# Patient Record
Sex: FEMALE | Race: WHITE
[De-identification: names, ages, dates, MRNs, and addresses within clinical notes are randomized per-mention and may not be internally consistent; named-entity substitution may affect disease eponyms.]

---

## 2017-09-23 ENCOUNTER — HOSPITAL ENCOUNTER (EMERGENCY)
Dept: HOSPITAL 12 - ER | Age: 67
Discharge: HOME | End: 2017-09-23
Payer: MEDICARE

## 2017-09-23 VITALS — DIASTOLIC BLOOD PRESSURE: 77 MMHG | SYSTOLIC BLOOD PRESSURE: 133 MMHG

## 2017-09-23 VITALS — WEIGHT: 175 LBS | BODY MASS INDEX: 32.2 KG/M2 | HEIGHT: 62 IN

## 2017-09-23 DIAGNOSIS — I10: ICD-10-CM

## 2017-09-23 DIAGNOSIS — Z90.710: ICD-10-CM

## 2017-09-23 DIAGNOSIS — Z90.49: ICD-10-CM

## 2017-09-23 DIAGNOSIS — K85.90: Primary | ICD-10-CM

## 2017-09-23 LAB
ALP SERPL-CCNC: 90 U/L (ref 50–136)
ALT SERPL W/O P-5'-P-CCNC: 23 U/L (ref 14–59)
APPEARANCE UR: CLEAR
AST SERPL-CCNC: 20 U/L (ref 15–37)
BASOPHILS # BLD AUTO: 0 K/UL (ref 0–8)
BASOPHILS NFR BLD AUTO: 0.3 % (ref 0–2)
BILIRUB DIRECT SERPL-MCNC: 0.1 MG/DL (ref 0–0.2)
BILIRUB SERPL-MCNC: 0.5 MG/DL (ref 0.2–1)
BILIRUB UR QL STRIP: NEGATIVE
BUN SERPL-MCNC: 12 MG/DL (ref 7–18)
CHLORIDE SERPL-SCNC: 108 MMOL/L (ref 98–107)
CO2 SERPL-SCNC: 27 MMOL/L (ref 21–32)
COLOR UR: YELLOW
CREAT SERPL-MCNC: 0.5 MG/DL (ref 0.6–1.3)
DEPRECATED SQUAMOUS URNS QL MICRO: (no result) /HPF
EOSINOPHIL # BLD AUTO: 0.1 K/UL (ref 0–0.7)
EOSINOPHIL NFR BLD AUTO: 0.5 % (ref 0–7)
GLUCOSE SERPL-MCNC: 95 MG/DL (ref 74–106)
GLUCOSE UR STRIP-MCNC: NEGATIVE MG/DL
HCT VFR BLD AUTO: 41.5 % (ref 31.2–41.9)
HGB BLD-MCNC: 14.3 G/DL (ref 10.9–14.3)
KETONES UR STRIP-MCNC: NEGATIVE MG/DL
LEUKOCYTE ESTERASE UR QL STRIP: (no result)
LIPASE SERPL-CCNC: 163 U/L (ref 73–393)
LYMPHOCYTES # BLD AUTO: 1.3 K/UL (ref 20–40)
LYMPHOCYTES NFR BLD AUTO: 10.9 % (ref 20.5–51.5)
MCH RBC QN AUTO: 30.6 UUG (ref 24.7–32.8)
MCHC RBC AUTO-ENTMCNC: 34 G/DL (ref 32.3–35.6)
MCV RBC AUTO: 88.9 FL (ref 75.5–95.3)
MONOCYTES # BLD AUTO: 0.7 K/UL (ref 2–10)
MONOCYTES NFR BLD AUTO: 5.7 % (ref 0–11)
MUCOUS THREADS URNS QL MICRO: (no result) /LPF
NEUTROPHILS # BLD AUTO: 10.1 K/UL (ref 1.8–8.9)
NEUTROPHILS NFR BLD AUTO: 82.6 % (ref 38.5–71.5)
NITRITE UR QL STRIP: NEGATIVE
PH UR STRIP: 5.5 [PH] (ref 5–8)
PLATELET # BLD AUTO: 191 K/UL (ref 179–408)
POTASSIUM SERPL-SCNC: 3.5 MMOL/L (ref 3.5–5.1)
PROT UR QL STRIP: NEGATIVE
RBC # BLD AUTO: 4.67 MIL/UL (ref 3.63–4.92)
RBC #/AREA URNS HPF: (no result) /HPF (ref 0–3)
SP GR UR STRIP: 1.01 (ref 1–1.03)
UROBILINOGEN UR STRIP-MCNC: 0.2 E.U./DL
WBC # BLD AUTO: 12.2 K/UL (ref 3.8–11.8)
WBC #/AREA URNS HPF: (no result) /HPF
WBC #/AREA URNS HPF: (no result) /HPF (ref 0–3)
WS STN SPEC: 8 G/DL (ref 6.4–8.2)

## 2017-09-23 PROCEDURE — 96375 TX/PRO/DX INJ NEW DRUG ADDON: CPT

## 2017-09-23 PROCEDURE — 85730 THROMBOPLASTIN TIME PARTIAL: CPT

## 2017-09-23 PROCEDURE — 80076 HEPATIC FUNCTION PANEL: CPT

## 2017-09-23 PROCEDURE — 83690 ASSAY OF LIPASE: CPT

## 2017-09-23 PROCEDURE — 80048 BASIC METABOLIC PNL TOTAL CA: CPT

## 2017-09-23 PROCEDURE — 99285 EMERGENCY DEPT VISIT HI MDM: CPT

## 2017-09-23 PROCEDURE — 71010: CPT

## 2017-09-23 PROCEDURE — A4663 DIALYSIS BLOOD PRESSURE CUFF: HCPCS

## 2017-09-23 PROCEDURE — 85025 COMPLETE CBC W/AUTO DIFF WBC: CPT

## 2017-09-23 PROCEDURE — C9113 INJ PANTOPRAZOLE SODIUM, VIA: HCPCS

## 2017-09-23 PROCEDURE — 36415 COLL VENOUS BLD VENIPUNCTURE: CPT

## 2017-09-23 PROCEDURE — 96374 THER/PROPH/DIAG INJ IV PUSH: CPT

## 2017-09-23 PROCEDURE — 74176 CT ABD & PELVIS W/O CONTRAST: CPT

## 2017-09-23 PROCEDURE — 81001 URINALYSIS AUTO W/SCOPE: CPT

## 2017-09-23 PROCEDURE — 93005 ELECTROCARDIOGRAM TRACING: CPT

## 2017-09-23 PROCEDURE — 96361 HYDRATE IV INFUSION ADD-ON: CPT

## 2017-09-23 PROCEDURE — 87086 URINE CULTURE/COLONY COUNT: CPT

## 2017-09-23 NOTE — NUR
Patient is resting comfortably in bed with eyes closed, some relief expressed, 
pending results & disposition

## 2017-09-23 NOTE — NUR
Patient discharged to home in stable conditon.  Written and verbal after care 
instructions given. 

Patient and son verbalizes understanding of instructions.

## 2017-09-29 ENCOUNTER — HOSPITAL ENCOUNTER (INPATIENT)
Dept: HOSPITAL 12 - ER | Age: 67
LOS: 4 days | Discharge: HOME | DRG: 395 | End: 2017-10-03
Payer: MEDICARE

## 2017-09-29 VITALS — SYSTOLIC BLOOD PRESSURE: 141 MMHG | DIASTOLIC BLOOD PRESSURE: 63 MMHG

## 2017-09-29 VITALS — WEIGHT: 170 LBS | HEIGHT: 62 IN | BODY MASS INDEX: 31.28 KG/M2

## 2017-09-29 VITALS — DIASTOLIC BLOOD PRESSURE: 53 MMHG | SYSTOLIC BLOOD PRESSURE: 122 MMHG

## 2017-09-29 DIAGNOSIS — K64.8: ICD-10-CM

## 2017-09-29 DIAGNOSIS — K31.7: ICD-10-CM

## 2017-09-29 DIAGNOSIS — F32.9: ICD-10-CM

## 2017-09-29 DIAGNOSIS — G89.29: ICD-10-CM

## 2017-09-29 DIAGNOSIS — I10: ICD-10-CM

## 2017-09-29 DIAGNOSIS — K66.0: Primary | ICD-10-CM

## 2017-09-29 DIAGNOSIS — K76.0: ICD-10-CM

## 2017-09-29 DIAGNOSIS — K29.70: ICD-10-CM

## 2017-09-29 DIAGNOSIS — E87.6: ICD-10-CM

## 2017-09-29 DIAGNOSIS — K57.30: ICD-10-CM

## 2017-09-29 DIAGNOSIS — Z90.710: ICD-10-CM

## 2017-09-29 DIAGNOSIS — Z90.49: ICD-10-CM

## 2017-09-29 DIAGNOSIS — E66.9: ICD-10-CM

## 2017-09-29 DIAGNOSIS — K44.9: ICD-10-CM

## 2017-09-29 DIAGNOSIS — R74.0: ICD-10-CM

## 2017-09-29 LAB
ALP SERPL-CCNC: 106 U/L (ref 50–136)
ALT SERPL W/O P-5'-P-CCNC: 52 U/L (ref 14–59)
APPEARANCE UR: CLEAR
AST SERPL-CCNC: 25 U/L (ref 15–37)
BASOPHILS # BLD AUTO: 0.1 K/UL (ref 0–8)
BASOPHILS NFR BLD AUTO: 0.7 % (ref 0–2)
BILIRUB DIRECT SERPL-MCNC: 0.1 MG/DL (ref 0–0.2)
BILIRUB SERPL-MCNC: 0.5 MG/DL (ref 0.2–1)
BILIRUB UR QL STRIP: NEGATIVE
BUN SERPL-MCNC: 9 MG/DL (ref 7–18)
CHLORIDE SERPL-SCNC: 106 MMOL/L (ref 98–107)
CO2 SERPL-SCNC: 30 MMOL/L (ref 21–32)
COLOR UR: YELLOW
CREAT SERPL-MCNC: 0.6 MG/DL (ref 0.6–1.3)
DEPRECATED SQUAMOUS URNS QL MICRO: (no result) /HPF
EOSINOPHIL # BLD AUTO: 0.1 K/UL (ref 0–0.7)
EOSINOPHIL NFR BLD AUTO: 0.9 % (ref 0–7)
GLUCOSE SERPL-MCNC: 94 MG/DL (ref 74–106)
GLUCOSE UR STRIP-MCNC: NEGATIVE MG/DL
HCT VFR BLD AUTO: 43.8 % (ref 31.2–41.9)
HGB BLD-MCNC: 15 G/DL (ref 10.9–14.3)
HGB UR QL STRIP: NEGATIVE
KETONES UR STRIP-MCNC: NEGATIVE MG/DL
LEUKOCYTE ESTERASE UR QL STRIP: (no result)
LIPASE SERPL-CCNC: 225 U/L (ref 73–393)
LYMPHOCYTES # BLD AUTO: 1.7 K/UL (ref 20–40)
LYMPHOCYTES NFR BLD AUTO: 21 % (ref 20.5–51.5)
MCH RBC QN AUTO: 30.8 UUG (ref 24.7–32.8)
MCHC RBC AUTO-ENTMCNC: 34 G/DL (ref 32.3–35.6)
MCV RBC AUTO: 89.7 FL (ref 75.5–95.3)
MONOCYTES # BLD AUTO: 0.4 K/UL (ref 2–10)
MONOCYTES NFR BLD AUTO: 5.5 % (ref 0–11)
NEUTROPHILS # BLD AUTO: 5.7 K/UL (ref 1.8–8.9)
NEUTROPHILS NFR BLD AUTO: 71.9 % (ref 38.5–71.5)
NITRITE UR QL STRIP: NEGATIVE
PH UR STRIP: 7 [PH] (ref 5–8)
PLATELET # BLD AUTO: 208 K/UL (ref 179–408)
POTASSIUM SERPL-SCNC: 4 MMOL/L (ref 3.5–5.1)
PROT UR QL STRIP: NEGATIVE
RBC # BLD AUTO: 4.88 MIL/UL (ref 3.63–4.92)
RBC #/AREA URNS HPF: (no result) /HPF (ref 0–3)
SP GR UR STRIP: 1.01 (ref 1–1.03)
UROBILINOGEN UR STRIP-MCNC: 0.2 E.U./DL
WBC # BLD AUTO: 7.9 K/UL (ref 3.8–11.8)
WBC #/AREA URNS HPF: (no result) /HPF
WBC #/AREA URNS HPF: (no result) /HPF (ref 0–3)
WS STN SPEC: 8.2 G/DL (ref 6.4–8.2)

## 2017-09-29 PROCEDURE — A4217 STERILE WATER/SALINE, 500 ML: HCPCS

## 2017-09-29 PROCEDURE — A4663 DIALYSIS BLOOD PRESSURE CUFF: HCPCS

## 2017-09-29 RX ADMIN — SODIUM CHLORIDE PRN MLS/HR: 0.9 INJECTION, SOLUTION INTRAVENOUS at 15:14

## 2017-09-29 NOTE — NUR
Dr Lovelace at bedside for re-eval. Pt states pain and nausea improved. Call 
placed to [facility] for admission.

## 2017-09-29 NOTE — NUR
67 year old female admitted to room 218 for abdominal pain in stable condition.pt is 
axox3,daughter is at bed side,orient the pt to room and surroundings,call light with in 
reach

## 2017-09-30 VITALS — DIASTOLIC BLOOD PRESSURE: 53 MMHG | SYSTOLIC BLOOD PRESSURE: 129 MMHG

## 2017-09-30 VITALS — DIASTOLIC BLOOD PRESSURE: 60 MMHG | SYSTOLIC BLOOD PRESSURE: 118 MMHG

## 2017-09-30 VITALS — DIASTOLIC BLOOD PRESSURE: 47 MMHG | SYSTOLIC BLOOD PRESSURE: 115 MMHG

## 2017-09-30 VITALS — DIASTOLIC BLOOD PRESSURE: 66 MMHG | SYSTOLIC BLOOD PRESSURE: 137 MMHG

## 2017-09-30 LAB
ALP SERPL-CCNC: 165 U/L (ref 50–136)
ALT SERPL W/O P-5'-P-CCNC: 99 U/L (ref 14–59)
AST SERPL-CCNC: 114 U/L (ref 15–37)
BASOPHILS # BLD AUTO: 0 K/UL (ref 0–8)
BASOPHILS NFR BLD AUTO: 0 % (ref 0–2)
BILIRUB SERPL-MCNC: 0.6 MG/DL (ref 0.2–1)
BUN SERPL-MCNC: 8 MG/DL (ref 7–18)
CHLORIDE SERPL-SCNC: 109 MMOL/L (ref 98–107)
CHOLEST SERPL-MCNC: 181 MG/DL (ref ?–200)
CO2 SERPL-SCNC: 28 MMOL/L (ref 21–32)
CREAT SERPL-MCNC: 0.6 MG/DL (ref 0.6–1.3)
EOSINOPHIL # BLD AUTO: 0.1 K/UL (ref 0–0.7)
EOSINOPHIL NFR BLD AUTO: 1.4 % (ref 0–7)
GLUCOSE SERPL-MCNC: 96 MG/DL (ref 74–106)
HCT VFR BLD AUTO: 39.3 % (ref 37–47)
HDLC SERPL-MCNC: 54 MG/DL (ref 40–60)
HGB BLD-MCNC: 13.1 G/DL (ref 12–16)
LYMPHOCYTES # BLD AUTO: 1.1 K/UL (ref 0.8–4.8)
LYMPHOCYTES NFR BLD AUTO: 17.6 % (ref 20.5–51.5)
MAGNESIUM SERPL-MCNC: 1.9 MG/DL (ref 1.8–2.4)
MCH RBC QN AUTO: 30.1 UUG (ref 27–31)
MCHC RBC AUTO-ENTMCNC: 33 G/DL (ref 32–37)
MCV RBC AUTO: 90.4 FL (ref 81–99)
MONOCYTES # BLD AUTO: 0.5 K/UL (ref 0.1–1.3)
MONOCYTES NFR BLD AUTO: 8 % (ref 0–11)
NEUTROPHILS # BLD AUTO: 4.8 K/UL (ref 1.8–8.9)
NEUTROPHILS NFR BLD AUTO: 73 % (ref 38.5–71.5)
PHOSPHATE SERPL-MCNC: 2.7 MG/DL (ref 2.5–4.9)
PLATELET # BLD AUTO: 190 K/UL (ref 150–450)
POTASSIUM SERPL-SCNC: 3.8 MMOL/L (ref 3.5–5.1)
RBC # BLD AUTO: 4.35 MIL/UL (ref 4.2–5.4)
TRIGL SERPL-MCNC: 74 MG/DL (ref 30–150)
TSH SERPL DL<=0.005 MIU/L-ACNC: 1.74 MIU/ML (ref 0.36–3.74)
WBC # BLD AUTO: 6.5 K/UL (ref 4–11.2)
WS STN SPEC: 7 G/DL (ref 6.4–8.2)

## 2017-09-30 RX ADMIN — SODIUM CHLORIDE PRN MLS/HR: 0.9 INJECTION, SOLUTION INTRAVENOUS at 05:24

## 2017-09-30 RX ADMIN — Medication SCH MG: at 21:23

## 2017-09-30 RX ADMIN — Medication SCH MG: at 13:14

## 2017-09-30 RX ADMIN — SODIUM CHLORIDE PRN MLS/HR: 0.9 INJECTION, SOLUTION INTRAVENOUS at 18:14

## 2017-09-30 RX ADMIN — PANTOPRAZOLE SODIUM SCH MG: 40 TABLET, DELAYED RELEASE ORAL at 07:37

## 2017-09-30 NOTE — NUR
RECEIVED PATIENT AWAKE ALERT AND ORIENTED DENIES PAIN OR DISCOMFORTS AT THIS TIME.REMAIN ON 
IVF AS ORDERED TO HER LEFT ANTECUBITAL WITH NO S/S OF INFILTERATION AT THIS TIME MADE 
COMFORTABLE AND WILL CONTINUE TO OBSERVE PATIENT.

## 2017-09-30 NOTE — NUR
Received pt on bed alert, awake and oriented x4, Papua [place] Guinean speaking. Family at bedside during 
this time. No acute distress noted. No complaints of pain or discomfort. Breathing even and 
unlabored with normal respirations. IV on LAC intact and patent. Vital signs stable. Call 
light within reach. All needs attended. Will continue to monitor.

## 2017-10-01 VITALS — SYSTOLIC BLOOD PRESSURE: 107 MMHG | DIASTOLIC BLOOD PRESSURE: 59 MMHG

## 2017-10-01 VITALS — DIASTOLIC BLOOD PRESSURE: 76 MMHG | SYSTOLIC BLOOD PRESSURE: 121 MMHG

## 2017-10-01 VITALS — SYSTOLIC BLOOD PRESSURE: 136 MMHG | DIASTOLIC BLOOD PRESSURE: 60 MMHG

## 2017-10-01 VITALS — SYSTOLIC BLOOD PRESSURE: 130 MMHG | DIASTOLIC BLOOD PRESSURE: 55 MMHG

## 2017-10-01 LAB
ALP SERPL-CCNC: 132 U/L (ref 50–136)
ALT SERPL W/O P-5'-P-CCNC: 69 U/L (ref 14–59)
AST SERPL-CCNC: 39 U/L (ref 15–37)
BILIRUB DIRECT SERPL-MCNC: 0.1 MG/DL (ref 0–0.2)
BILIRUB SERPL-MCNC: 0.4 MG/DL (ref 0.2–1)
WS STN SPEC: 7.3 G/DL (ref 6.4–8.2)

## 2017-10-01 RX ADMIN — Medication SCH MG: at 06:26

## 2017-10-01 RX ADMIN — FAMOTIDINE SCH MG: 20 TABLET, FILM COATED ORAL at 20:16

## 2017-10-01 RX ADMIN — DICYCLOMINE HYDROCHLORIDE SCH MG: 20 INJECTION, SOLUTION INTRAMUSCULAR at 12:06

## 2017-10-01 RX ADMIN — SODIUM CHLORIDE PRN MLS/HR: 0.9 INJECTION, SOLUTION INTRAVENOUS at 22:04

## 2017-10-01 RX ADMIN — SODIUM CHLORIDE PRN MLS/HR: 0.9 INJECTION, SOLUTION INTRAVENOUS at 07:55

## 2017-10-01 RX ADMIN — FAMOTIDINE SCH MG: 20 TABLET, FILM COATED ORAL at 12:03

## 2017-10-01 RX ADMIN — Medication SCH MG: at 12:03

## 2017-10-01 RX ADMIN — Medication SCH MG: at 22:02

## 2017-10-01 RX ADMIN — Medication SCH MG: at 13:01

## 2017-10-01 RX ADMIN — PANTOPRAZOLE SODIUM SCH MG: 40 TABLET, DELAYED RELEASE ORAL at 06:26

## 2017-10-01 RX ADMIN — DICYCLOMINE HYDROCHLORIDE SCH MG: 20 INJECTION, SOLUTION INTRAMUSCULAR at 17:45

## 2017-10-01 NOTE — NUR
RECEIVED PATIENT FROM PREVIOUS SHIFT. PATIENT IN STABLE CONDITION, NO SIGNS OF DISTRESS. 
VITAL SIGNS WNL. PATIENT COMPLAINING OF ABD PAIN BUT DOES NOT WANT PAIN MEDICATION. NO FALL 
RISK. IV ACCESS NOTED, PATENT, INTACT. O2 NOT NEEDED. BED IN LOCKED POSITION. CALL-LIGHT 
WITHIN REACH. SAFETY AND COMFORT PROVIDED.

## 2017-10-01 NOTE — NUR
PATIENT SEEN AND EXAMINED BY DR LACY DALLAS SPOKE WITH PATIENTS DAUGHTER AT LENGTH WITH NEW 
ORDERS.DR DALLAS STATED THAT HE ORDERED PHYSICIAN CONSULT WITH DR RANDOLPH FOR A POSSIBLE 
COLONOSCOPY TOMORROW PATIENT AND FAMILY EXPRESSED UNDERSTANDING.

## 2017-10-01 NOTE — NUR
EDUCATED TO PATIENT AND  THE IMPORTANCE OF DRINKING GO-LIGHTLY. VERBALIZED TO PATIENT 
AND  ABOUT THE IMPORTANCE OF CLEAR LIQUID DIET, AND NPO STATUS AFTER 0700 ON 10/2/17 
FOR EGD. PATIENT AND  VERBALIZED THE IMPORTANCE.

## 2017-10-01 NOTE — NUR
PATIENT STATED THAT HER PAIN CAME BACK MORE TOLERABLE NOW SINCE SHE TOOK THE MEDICATIONS 
EARLIER THIS AM REMAIN ON IVF AS ORDERED EATING HER B/FAST BUT STATED THAT SHE CAN ONLY EAT 
A LITTLE DUE TO THE PAIN.

## 2017-10-01 NOTE — NUR
Patient complained of abdominal pain, bentyl given as ordered. Verbalization of relief was 
noted. However, daughter requested to call the docror. Tried to call on call MD, still 
awaiting for response. Call light within reach. All needs attended.

## 2017-10-01 NOTE — NUR
DR RANDOLPH HERE TO SEE PATIENT WITH NEW ORDERS PATIENT IS FOR EGD/COLONOSCOPY TOMORROW 
FAMILY AND PATIENT AWARE AND WILL OBTAIN CONSCENT.

## 2017-10-02 VITALS — SYSTOLIC BLOOD PRESSURE: 116 MMHG | DIASTOLIC BLOOD PRESSURE: 47 MMHG

## 2017-10-02 VITALS — DIASTOLIC BLOOD PRESSURE: 48 MMHG | SYSTOLIC BLOOD PRESSURE: 118 MMHG

## 2017-10-02 VITALS — DIASTOLIC BLOOD PRESSURE: 64 MMHG | SYSTOLIC BLOOD PRESSURE: 133 MMHG

## 2017-10-02 VITALS — SYSTOLIC BLOOD PRESSURE: 128 MMHG | DIASTOLIC BLOOD PRESSURE: 58 MMHG

## 2017-10-02 VITALS — SYSTOLIC BLOOD PRESSURE: 115 MMHG | DIASTOLIC BLOOD PRESSURE: 51 MMHG

## 2017-10-02 VITALS — DIASTOLIC BLOOD PRESSURE: 55 MMHG | SYSTOLIC BLOOD PRESSURE: 138 MMHG

## 2017-10-02 VITALS — DIASTOLIC BLOOD PRESSURE: 76 MMHG | SYSTOLIC BLOOD PRESSURE: 125 MMHG

## 2017-10-02 LAB
ALP SERPL-CCNC: 118 U/L (ref 50–136)
ALT SERPL W/O P-5'-P-CCNC: 52 U/L (ref 14–59)
AST SERPL-CCNC: 29 U/L (ref 15–37)
BASOPHILS # BLD AUTO: 0 K/UL (ref 0–8)
BASOPHILS NFR BLD AUTO: 0.3 % (ref 0–2)
BILIRUB SERPL-MCNC: 0.4 MG/DL (ref 0.2–1)
BUN SERPL-MCNC: 8 MG/DL (ref 7–18)
CHLORIDE SERPL-SCNC: 108 MMOL/L (ref 98–107)
CO2 SERPL-SCNC: 27 MMOL/L (ref 21–32)
CREAT SERPL-MCNC: 0.5 MG/DL (ref 0.6–1.3)
EOSINOPHIL # BLD AUTO: 0 K/UL (ref 0–0.7)
EOSINOPHIL NFR BLD AUTO: 0.5 % (ref 0–7)
GLUCOSE SERPL-MCNC: 98 MG/DL (ref 74–106)
HCT VFR BLD AUTO: 37.8 % (ref 37–47)
HGB BLD-MCNC: 12.7 G/DL (ref 12–16)
LIPASE SERPL-CCNC: 256 U/L (ref 73–393)
LYMPHOCYTES # BLD AUTO: 1.3 K/UL (ref 0.8–4.8)
LYMPHOCYTES NFR BLD AUTO: 22.6 % (ref 20.5–51.5)
MAGNESIUM SERPL-MCNC: 1.8 MG/DL (ref 1.8–2.4)
MCH RBC QN AUTO: 30 UUG (ref 27–31)
MCHC RBC AUTO-ENTMCNC: 34 G/DL (ref 32–37)
MCV RBC AUTO: 89.4 FL (ref 81–99)
MONOCYTES # BLD AUTO: 0.4 K/UL (ref 0.1–1.3)
MONOCYTES NFR BLD AUTO: 7.3 % (ref 0–11)
NEUTROPHILS # BLD AUTO: 3.9 K/UL (ref 1.8–8.9)
NEUTROPHILS NFR BLD AUTO: 69.3 % (ref 38.5–71.5)
PHOSPHATE SERPL-MCNC: 2.8 MG/DL (ref 2.5–4.9)
PLATELET # BLD AUTO: 206 K/UL (ref 150–450)
POTASSIUM SERPL-SCNC: 3.4 MMOL/L (ref 3.5–5.1)
RBC # BLD AUTO: 4.23 MIL/UL (ref 4.2–5.4)
WBC # BLD AUTO: 5.6 K/UL (ref 4–11.2)
WS STN SPEC: 6.9 G/DL (ref 6.4–8.2)

## 2017-10-02 PROCEDURE — 0DB68ZX EXCISION OF STOMACH, VIA NATURAL OR ARTIFICIAL OPENING ENDOSCOPIC, DIAGNOSTIC: ICD-10-PCS | Performed by: SURGERY

## 2017-10-02 PROCEDURE — 0DJD8ZZ INSPECTION OF LOWER INTESTINAL TRACT, VIA NATURAL OR ARTIFICIAL OPENING ENDOSCOPIC: ICD-10-PCS | Performed by: SURGERY

## 2017-10-02 RX ADMIN — FAMOTIDINE SCH MG: 20 TABLET, FILM COATED ORAL at 21:01

## 2017-10-02 RX ADMIN — PANTOPRAZOLE SODIUM SCH MG: 40 TABLET, DELAYED RELEASE ORAL at 05:58

## 2017-10-02 RX ADMIN — DICYCLOMINE HYDROCHLORIDE SCH MG: 20 INJECTION, SOLUTION INTRAMUSCULAR at 12:00

## 2017-10-02 RX ADMIN — PANCRELIPASE LIPASE, PANCRELIPASE AMYLASE, AND PANCRELIPASE PROTEASE SCH EACH: 4200; 24600; 14200 CAPSULE, DELAYED RELEASE ORAL at 17:10

## 2017-10-02 RX ADMIN — Medication SCH MG: at 05:58

## 2017-10-02 RX ADMIN — Medication SCH MG: at 08:21

## 2017-10-02 RX ADMIN — Medication SCH CAP: at 17:10

## 2017-10-02 RX ADMIN — SODIUM CHLORIDE PRN MLS/HR: 0.9 INJECTION, SOLUTION INTRAVENOUS at 18:18

## 2017-10-02 RX ADMIN — DICYCLOMINE HYDROCHLORIDE SCH MG: 20 INJECTION, SOLUTION INTRAMUSCULAR at 00:21

## 2017-10-02 RX ADMIN — Medication SCH MG: at 13:42

## 2017-10-02 RX ADMIN — Medication SCH MG: at 21:01

## 2017-10-02 RX ADMIN — DICYCLOMINE HYDROCHLORIDE SCH MG: 20 INJECTION, SOLUTION INTRAMUSCULAR at 05:57

## 2017-10-02 RX ADMIN — SODIUM CHLORIDE PRN MLS/HR: 0.9 INJECTION, SOLUTION INTRAVENOUS at 12:37

## 2017-10-02 RX ADMIN — Medication SCH CAP: at 21:01

## 2017-10-02 RX ADMIN — FAMOTIDINE SCH MG: 20 TABLET, FILM COATED ORAL at 08:21

## 2017-10-02 NOTE — NUR
PATIENT RETURNED FROM GI LAB BY BED AWAKE ALERT AND ORIENTED DENIES PAIN OR DISCOMFORTS 
STATED THAT SHE FEELS GASSY AND BURPING.ABLE TO TOLERATE JUICE ICE AND JELLO MADE 
COMFORTABLE AND WILL CONTINUE TO OBSERVE.

## 2017-10-02 NOTE — NUR
TOLERATED SOFT DIET FAIR AMOUNT WITH NO NAUSEA OR VOMITING AT THIS TIME.REMAIN ON IVF AS 
ORDERED AND WILL CONTINUE TO OBSERVE.

## 2017-10-02 NOTE — NUR
RECEIVED SHIFT REPORT FROM PREVIOUS SHIFT'S NURSE. PATIENT IN STABLE CONDITION. NO SIGNS OF 
DISTRESS. SAFETY AND COMFORT PROVIDED TO PATIENT.

## 2017-10-02 NOTE — NUR
PATIENT IS FOR EGD/COLONOSCOPY TODAY AND SHE IS CURRENTLY NPO AND HER DUE MEDICATIONS WAS 
GIVEN WITH A SIP OF WATER PATIENT EDUCATED WITH HER  PRESENT AND SHE EXPRESSED 
UNDERSTANDING.

## 2017-10-02 NOTE — NUR
POTASSIUM LEVEL IS 3.4 WITH NEW ORDERS PATIENT IS NPO FOR EGD/COLONOSCOPY WILL ADMINISTER 
AFTER THE PROCEDURE.

## 2017-10-02 NOTE — NUR
PATIENT VERBALIZED THAT SHE FEELS RESTLESS AND FEELS HERSELF SHIVERING. PATIENT'S VITAL 
SIGNS TAKEN AND WNL. PATIENT IN STABLE CONDITION. NO SIGNS OF DISTRESS. WILL CONTINUE TO 
MONITOR.

## 2017-10-03 VITALS — SYSTOLIC BLOOD PRESSURE: 101 MMHG | DIASTOLIC BLOOD PRESSURE: 56 MMHG

## 2017-10-03 VITALS — DIASTOLIC BLOOD PRESSURE: 48 MMHG | SYSTOLIC BLOOD PRESSURE: 125 MMHG

## 2017-10-03 VITALS — DIASTOLIC BLOOD PRESSURE: 49 MMHG | SYSTOLIC BLOOD PRESSURE: 119 MMHG

## 2017-10-03 LAB
BASOPHILS # BLD AUTO: 0 K/UL (ref 0–8)
BASOPHILS NFR BLD AUTO: 0.6 % (ref 0–2)
BUN SERPL-MCNC: 5 MG/DL (ref 7–18)
CHLORIDE SERPL-SCNC: 108 MMOL/L (ref 98–107)
CO2 SERPL-SCNC: 30 MMOL/L (ref 21–32)
CREAT SERPL-MCNC: 0.5 MG/DL (ref 0.6–1.3)
EOSINOPHIL # BLD AUTO: 0.1 K/UL (ref 0–0.7)
EOSINOPHIL NFR BLD AUTO: 2.1 % (ref 0–7)
GLUCOSE SERPL-MCNC: 88 MG/DL (ref 74–106)
HCT VFR BLD AUTO: 38.3 % (ref 37–47)
HGB BLD-MCNC: 12.8 G/DL (ref 12–16)
LYMPHOCYTES # BLD AUTO: 1.3 K/UL (ref 0.8–4.8)
LYMPHOCYTES NFR BLD AUTO: 28.5 % (ref 20.5–51.5)
MAGNESIUM SERPL-MCNC: 1.8 MG/DL (ref 1.8–2.4)
MCH RBC QN AUTO: 30.2 UUG (ref 27–31)
MCHC RBC AUTO-ENTMCNC: 33 G/DL (ref 32–37)
MCV RBC AUTO: 90.7 FL (ref 81–99)
MONOCYTES # BLD AUTO: 0.4 K/UL (ref 0.1–1.3)
MONOCYTES NFR BLD AUTO: 8.3 % (ref 0–11)
NEUTROPHILS # BLD AUTO: 2.8 K/UL (ref 1.8–8.9)
NEUTROPHILS NFR BLD AUTO: 60.5 % (ref 38.5–71.5)
PHOSPHATE SERPL-MCNC: 2.8 MG/DL (ref 2.5–4.9)
PLATELET # BLD AUTO: 195 K/UL (ref 150–450)
POTASSIUM SERPL-SCNC: 3.7 MMOL/L (ref 3.5–5.1)
RBC # BLD AUTO: 4.22 MIL/UL (ref 4.2–5.4)
WBC # BLD AUTO: 4.6 K/UL (ref 4–11.2)

## 2017-10-03 RX ADMIN — Medication SCH CAP: at 12:26

## 2017-10-03 RX ADMIN — PANCRELIPASE LIPASE, PANCRELIPASE AMYLASE, AND PANCRELIPASE PROTEASE SCH EACH: 4200; 24600; 14200 CAPSULE, DELAYED RELEASE ORAL at 08:10

## 2017-10-03 RX ADMIN — Medication SCH MG: at 13:39

## 2017-10-03 RX ADMIN — Medication SCH MG: at 08:11

## 2017-10-03 RX ADMIN — Medication SCH MG: at 05:26

## 2017-10-03 RX ADMIN — PANCRELIPASE LIPASE, PANCRELIPASE AMYLASE, AND PANCRELIPASE PROTEASE SCH EACH: 4200; 24600; 14200 CAPSULE, DELAYED RELEASE ORAL at 17:31

## 2017-10-03 RX ADMIN — Medication SCH CAP: at 07:53

## 2017-10-03 RX ADMIN — PANTOPRAZOLE SODIUM SCH MG: 40 TABLET, DELAYED RELEASE ORAL at 05:26

## 2017-10-03 RX ADMIN — Medication SCH CAP: at 16:41

## 2017-10-03 RX ADMIN — FAMOTIDINE SCH MG: 20 TABLET, FILM COATED ORAL at 08:11

## 2017-10-03 RX ADMIN — PANCRELIPASE LIPASE, PANCRELIPASE AMYLASE, AND PANCRELIPASE PROTEASE SCH EACH: 4200; 24600; 14200 CAPSULE, DELAYED RELEASE ORAL at 12:26

## 2017-10-03 RX ADMIN — SODIUM CHLORIDE PRN MLS/HR: 0.9 INJECTION, SOLUTION INTRAVENOUS at 07:54

## 2017-10-03 NOTE — NUR
PATIENT IS ALERT AND ORIENTED WITH SOME ENGLISH MOSTLY Albanian DENIES PAIN OR DISCOMFORTS AT 
THIS TIME.CONTINUE TO TOLERATE MEDICATIONS AS ORDERED.IV FLUIDS REMAIN IN PROGRESS TO HER 
LEFT ANTECUBITAL WITH NO REDNESS MADE COMFORTABLE WITH NO DISTRESS AT THIS TIME.

## 2017-10-03 NOTE — NUR
PATIENT DISCHARGED PICKED UP BY HER DAUGHTER IN SATISFACTORY CONDITION WITH DISCHARGE 
INSTRUCTIONS AND I CALLED THE [facility] AND CONFIRMED THAT THE PATIENTS 
MEDICATION THAT WAS SENT ELECTRONICALLY BY DR SCHAEFFER WAS RECEIVED SPOKE WITH UMA 
AT THE PHARMACY AND SHE STATED THAT THE MEDICATION WILL BE READY FOR PATIENT TO PICK 
UP.[facility] WAS NOTIFIED OF PATIENTS DISCHARGE.HEP LOCK AND ID BAND REMOVED AND 
PATIENT TAKEN DOWN BY W/CHAIR TO HER DAUGHTERS CAR WITH ALL HER PERSONA; BELONGINGS.

## 2017-10-03 NOTE — NUR
PATIENT SEEN AND EXAMINED BY DR SCHAEFFER WITH DISCHARGE PLANNING THIS EVENING.DR SCHAEFFER SPOKE WITH THE PATIENTS DAUGHTER MARY AT LENGTH REGARDING PATIENTS PRESENT 
CONDITION ,PLAN OF CARE AND DISCHARGE PLANNING AND SHE EXPRESSED UNDERSTANDING.

## 2017-12-18 ENCOUNTER — HOSPITAL ENCOUNTER (EMERGENCY)
Dept: HOSPITAL 12 - ER | Age: 67
Discharge: HOME | End: 2017-12-18
Payer: MEDICARE

## 2017-12-18 VITALS — WEIGHT: 172 LBS | HEIGHT: 60 IN | BODY MASS INDEX: 33.77 KG/M2

## 2017-12-18 DIAGNOSIS — I10: ICD-10-CM

## 2017-12-18 DIAGNOSIS — Z88.5: ICD-10-CM

## 2017-12-18 DIAGNOSIS — B96.89: ICD-10-CM

## 2017-12-18 DIAGNOSIS — Z90.49: ICD-10-CM

## 2017-12-18 DIAGNOSIS — J20.8: Primary | ICD-10-CM

## 2017-12-18 DIAGNOSIS — R51: ICD-10-CM

## 2017-12-18 PROCEDURE — A4663 DIALYSIS BLOOD PRESSURE CUFF: HCPCS

## 2017-12-18 NOTE — NUR
Patient discharged to home in stable conditon with family.  Written and verbal 
after care instructions given. Patient verbalizes understanding of 
instructions. Stressed follow up with pmd.

## 2018-05-16 ENCOUNTER — HOSPITAL ENCOUNTER (EMERGENCY)
Dept: HOSPITAL 12 - ER | Age: 68
Discharge: HOME | End: 2018-05-16
Payer: MEDICARE

## 2018-05-16 VITALS — WEIGHT: 170 LBS | BODY MASS INDEX: 32.1 KG/M2 | HEIGHT: 61 IN

## 2018-05-16 DIAGNOSIS — I10: ICD-10-CM

## 2018-05-16 DIAGNOSIS — K21.0: Primary | ICD-10-CM

## 2018-05-16 DIAGNOSIS — K85.90: ICD-10-CM

## 2018-05-16 DIAGNOSIS — Z90.49: ICD-10-CM

## 2018-05-16 DIAGNOSIS — Z88.5: ICD-10-CM

## 2018-05-16 PROCEDURE — A4663 DIALYSIS BLOOD PRESSURE CUFF: HCPCS

## 2018-05-16 PROCEDURE — A9150 MISC/EXPER NON-PRESCRIPT DRU: HCPCS

## 2018-05-16 NOTE — NUR
IV removed.  Catheter intact and site benign. Pressure and 4x4 gauze applied to 
site. No bleeding noted.  Patient discharged to home in stable conditon & 
steady gait.  Written and verbal after care instructions given to patient and 
family. Patient and family verbalized understanding of instructions.

## 2018-10-06 ENCOUNTER — HOSPITAL ENCOUNTER (EMERGENCY)
Dept: HOSPITAL 12 - ER | Age: 68
Discharge: HOME | End: 2018-10-06
Payer: MEDICARE

## 2018-10-06 VITALS — SYSTOLIC BLOOD PRESSURE: 139 MMHG | DIASTOLIC BLOOD PRESSURE: 81 MMHG

## 2018-10-06 VITALS — HEIGHT: 62 IN | WEIGHT: 172 LBS | BODY MASS INDEX: 31.65 KG/M2

## 2018-10-06 DIAGNOSIS — R10.84: Primary | ICD-10-CM

## 2018-10-06 DIAGNOSIS — Z88.5: ICD-10-CM

## 2018-10-06 DIAGNOSIS — Z90.710: ICD-10-CM

## 2018-10-06 DIAGNOSIS — Z90.49: ICD-10-CM

## 2018-10-06 DIAGNOSIS — I10: ICD-10-CM

## 2018-10-06 DIAGNOSIS — K62.5: ICD-10-CM

## 2018-10-06 LAB
ALP SERPL-CCNC: 79 U/L (ref 50–136)
ALT SERPL W/O P-5'-P-CCNC: 22 U/L (ref 14–59)
AST SERPL-CCNC: 16 U/L (ref 15–37)
BASOPHILS # BLD AUTO: 0.1 K/UL (ref 0–8)
BASOPHILS NFR BLD AUTO: 0.9 % (ref 0–2)
BILIRUB DIRECT SERPL-MCNC: 0.1 MG/DL (ref 0–0.2)
BILIRUB SERPL-MCNC: 0.4 MG/DL (ref 0.2–1)
BUN SERPL-MCNC: 7 MG/DL (ref 7–18)
CHLORIDE SERPL-SCNC: 105 MMOL/L (ref 98–107)
CO2 SERPL-SCNC: 28 MMOL/L (ref 21–32)
CREAT SERPL-MCNC: 0.7 MG/DL (ref 0.6–1.3)
EOSINOPHIL # BLD AUTO: 0 K/UL (ref 0–0.7)
EOSINOPHIL NFR BLD AUTO: 0.3 % (ref 0–7)
GLUCOSE SERPL-MCNC: 100 MG/DL (ref 74–106)
HCT VFR BLD AUTO: 41.5 % (ref 31.2–41.9)
HCT VFR BLD AUTO: 42 % (ref 31.2–41.9)
HGB BLD-MCNC: 14.2 G/DL (ref 10.9–14.3)
HGB BLD-MCNC: 14.6 G/DL (ref 10.9–14.3)
LIPASE SERPL-CCNC: 140 U/L (ref 73–393)
LYMPHOCYTES # BLD AUTO: 1.5 K/UL (ref 20–40)
LYMPHOCYTES NFR BLD AUTO: 22.6 % (ref 20.5–51.5)
MCH RBC QN AUTO: 31.7 UUG (ref 24.7–32.8)
MCHC RBC AUTO-ENTMCNC: 35 G/DL (ref 32.3–35.6)
MCV RBC AUTO: 91.5 FL (ref 75.5–95.3)
MONOCYTES # BLD AUTO: 0.4 K/UL (ref 2–10)
MONOCYTES NFR BLD AUTO: 5.5 % (ref 0–11)
NEUTROPHILS # BLD AUTO: 4.8 K/UL (ref 1.8–8.9)
NEUTROPHILS NFR BLD AUTO: 70.7 % (ref 38.5–71.5)
PLATELET # BLD AUTO: 212 K/UL (ref 179–408)
POTASSIUM SERPL-SCNC: 3.6 MMOL/L (ref 3.5–5.1)
RBC # BLD AUTO: 4.59 MIL/UL (ref 3.63–4.92)
WBC # BLD AUTO: 6.8 K/UL (ref 3.8–11.8)
WS STN SPEC: 7.8 G/DL (ref 6.4–8.2)

## 2018-10-06 PROCEDURE — A4663 DIALYSIS BLOOD PRESSURE CUFF: HCPCS

## 2021-04-29 ENCOUNTER — HOSPITAL ENCOUNTER (EMERGENCY)
Dept: HOSPITAL 12 - ER | Age: 71
Discharge: HOME | End: 2021-04-29
Payer: MEDICARE

## 2021-04-29 VITALS — WEIGHT: 169 LBS | BODY MASS INDEX: 29.95 KG/M2 | HEIGHT: 63 IN

## 2021-04-29 VITALS — SYSTOLIC BLOOD PRESSURE: 118 MMHG | DIASTOLIC BLOOD PRESSURE: 73 MMHG

## 2021-04-29 DIAGNOSIS — G89.29: ICD-10-CM

## 2021-04-29 DIAGNOSIS — Z82.49: ICD-10-CM

## 2021-04-29 DIAGNOSIS — Z87.19: ICD-10-CM

## 2021-04-29 DIAGNOSIS — I10: ICD-10-CM

## 2021-04-29 DIAGNOSIS — R10.9: Primary | ICD-10-CM

## 2021-04-29 DIAGNOSIS — Z90.49: ICD-10-CM

## 2021-04-29 DIAGNOSIS — R11.0: ICD-10-CM

## 2021-04-29 DIAGNOSIS — K57.30: ICD-10-CM

## 2021-04-29 DIAGNOSIS — Z79.899: ICD-10-CM

## 2021-04-29 DIAGNOSIS — Z90.710: ICD-10-CM

## 2021-04-29 DIAGNOSIS — Z79.82: ICD-10-CM

## 2021-04-29 LAB
ALP SERPL-CCNC: 111 U/L (ref 50–136)
ALT SERPL W/O P-5'-P-CCNC: 20 U/L (ref 14–59)
AST SERPL-CCNC: 18 U/L (ref 15–37)
BASOPHILS # BLD AUTO: 0 K/UL (ref 0–8)
BASOPHILS NFR BLD AUTO: 0.6 % (ref 0–2)
BILIRUB DIRECT SERPL-MCNC: 0.1 MG/DL (ref 0–0.2)
BILIRUB SERPL-MCNC: 0.4 MG/DL (ref 0.2–1)
BUN SERPL-MCNC: 6 MG/DL (ref 7–18)
CHLORIDE SERPL-SCNC: 108 MMOL/L (ref 98–107)
CO2 SERPL-SCNC: 29 MMOL/L (ref 21–32)
CREAT SERPL-MCNC: 0.7 MG/DL (ref 0.6–1.3)
EOSINOPHIL # BLD AUTO: 0 K/UL (ref 0–0.7)
EOSINOPHIL NFR BLD AUTO: 0.6 % (ref 0–7)
GLUCOSE SERPL-MCNC: 121 MG/DL (ref 74–106)
HCT VFR BLD AUTO: 39.7 % (ref 31.2–41.9)
HGB BLD-MCNC: 13.3 G/DL (ref 10.9–14.3)
LIPASE SERPL-CCNC: 79 U/L (ref 73–393)
LYMPHOCYTES # BLD AUTO: 1.4 K/UL (ref 20–40)
LYMPHOCYTES NFR BLD AUTO: 19.4 % (ref 20.5–51.5)
MCH RBC QN AUTO: 30.3 UUG (ref 24.7–32.8)
MCHC RBC AUTO-ENTMCNC: 34 G/DL (ref 32.3–35.6)
MCV RBC AUTO: 90.2 FL (ref 75.5–95.3)
MONOCYTES # BLD AUTO: 0.5 K/UL (ref 2–10)
MONOCYTES NFR BLD AUTO: 6.6 % (ref 0–11)
NEUTROPHILS # BLD AUTO: 5.3 K/UL (ref 1.8–8.9)
NEUTROPHILS NFR BLD AUTO: 72.8 % (ref 38.5–71.5)
PLATELET # BLD AUTO: 216 K/UL (ref 179–408)
POTASSIUM SERPL-SCNC: 4.1 MMOL/L (ref 3.5–5.1)
RBC # BLD AUTO: 4.4 MIL/UL (ref 3.63–4.92)
WBC # BLD AUTO: 7.3 K/UL (ref 3.8–11.8)
WS STN SPEC: 7.5 G/DL (ref 6.4–8.2)

## 2021-04-29 PROCEDURE — A4663 DIALYSIS BLOOD PRESSURE CUFF: HCPCS

## 2021-04-29 PROCEDURE — 99284 EMERGENCY DEPT VISIT MOD MDM: CPT

## 2021-04-29 PROCEDURE — 83690 ASSAY OF LIPASE: CPT

## 2021-04-29 PROCEDURE — 85025 COMPLETE CBC W/AUTO DIFF WBC: CPT

## 2021-04-29 PROCEDURE — 80048 BASIC METABOLIC PNL TOTAL CA: CPT

## 2021-04-29 PROCEDURE — 74176 CT ABD & PELVIS W/O CONTRAST: CPT

## 2021-04-29 PROCEDURE — 36415 COLL VENOUS BLD VENIPUNCTURE: CPT

## 2021-04-29 PROCEDURE — 80076 HEPATIC FUNCTION PANEL: CPT

## 2021-04-29 PROCEDURE — 83605 ASSAY OF LACTIC ACID: CPT

## 2021-04-29 PROCEDURE — 96375 TX/PRO/DX INJ NEW DRUG ADDON: CPT

## 2021-04-29 PROCEDURE — 96374 THER/PROPH/DIAG INJ IV PUSH: CPT

## 2021-04-29 NOTE — NUR
Patient discharged to home in stable condition.  Written and verbal after care 
instructions given. 

Patient verbalizes understanding of instructions. Stressed follow up or return 
to ER for worsening s/s. IV removed.  Catheter intact and site benign. Pressure 
and 4x4 gauze applied to site. No bleeding noted. Patient ambulated with steady 
gait. Patient accompanied by daughter.

## 2021-04-29 NOTE — NUR
PATIENT HERE WITH HER DAUGHTER FOR C/O ABDOMINAL PAIN.  SHE WAS SEEN HERE 
UESTEDAY BUT FEELS PAIN IS WORSE TODAY. PLACED ON A MONITOR.  IV PLACED BY 
OTHER RN.  MEDS GIVEN AS ORDERED.  STATES PAIN HAS DIMINISHED ONLY SLIGHTLY.   
DAUGHTER AT BEDSIDE.

## 2021-07-21 ENCOUNTER — HOSPITAL ENCOUNTER (INPATIENT)
Dept: HOSPITAL 54 - ER | Age: 71
LOS: 7 days | Discharge: HOME HEALTH | DRG: 371 | End: 2021-07-28
Attending: NURSE PRACTITIONER | Admitting: NURSE PRACTITIONER
Payer: MEDICARE

## 2021-07-21 VITALS — HEIGHT: 62 IN | WEIGHT: 157.06 LBS | BODY MASS INDEX: 28.9 KG/M2

## 2021-07-21 DIAGNOSIS — J98.11: ICD-10-CM

## 2021-07-21 DIAGNOSIS — J90: ICD-10-CM

## 2021-07-21 DIAGNOSIS — F32.9: ICD-10-CM

## 2021-07-21 DIAGNOSIS — K76.0: ICD-10-CM

## 2021-07-21 DIAGNOSIS — K58.9: ICD-10-CM

## 2021-07-21 DIAGNOSIS — K29.80: ICD-10-CM

## 2021-07-21 DIAGNOSIS — E88.09: ICD-10-CM

## 2021-07-21 DIAGNOSIS — K42.9: ICD-10-CM

## 2021-07-21 DIAGNOSIS — E87.6: ICD-10-CM

## 2021-07-21 DIAGNOSIS — E66.9: ICD-10-CM

## 2021-07-21 DIAGNOSIS — Z90.49: ICD-10-CM

## 2021-07-21 DIAGNOSIS — Z90.710: ICD-10-CM

## 2021-07-21 DIAGNOSIS — E83.39: ICD-10-CM

## 2021-07-21 DIAGNOSIS — I70.0: ICD-10-CM

## 2021-07-21 DIAGNOSIS — Z20.822: ICD-10-CM

## 2021-07-21 DIAGNOSIS — K65.1: ICD-10-CM

## 2021-07-21 DIAGNOSIS — K65.9: Primary | ICD-10-CM

## 2021-07-21 DIAGNOSIS — K57.30: ICD-10-CM

## 2021-07-21 DIAGNOSIS — G89.29: ICD-10-CM

## 2021-07-21 DIAGNOSIS — E83.42: ICD-10-CM

## 2021-07-21 DIAGNOSIS — I10: ICD-10-CM

## 2021-07-21 DIAGNOSIS — K83.1: ICD-10-CM

## 2021-07-21 DIAGNOSIS — Z79.899: ICD-10-CM

## 2021-07-21 DIAGNOSIS — K83.9: ICD-10-CM

## 2021-07-21 DIAGNOSIS — D64.9: ICD-10-CM

## 2021-07-21 DIAGNOSIS — K85.90: ICD-10-CM

## 2021-07-21 LAB
ALBUMIN SERPL BCP-MCNC: 2.2 G/DL (ref 3.4–5)
ALP SERPL-CCNC: 85 U/L (ref 46–116)
ALT SERPL W P-5'-P-CCNC: 18 U/L (ref 12–78)
AST SERPL W P-5'-P-CCNC: 26 U/L (ref 15–37)
BASOPHILS # BLD AUTO: 0.1 K/UL (ref 0–0.2)
BASOPHILS NFR BLD AUTO: 1.2 % (ref 0–2)
BILIRUB DIRECT SERPL-MCNC: 0.3 MG/DL (ref 0–0.2)
BILIRUB SERPL-MCNC: 0.6 MG/DL (ref 0.2–1)
BUN SERPL-MCNC: 8 MG/DL (ref 7–18)
CALCIUM SERPL-MCNC: 8.1 MG/DL (ref 8.5–10.1)
CHLORIDE SERPL-SCNC: 104 MMOL/L (ref 98–107)
CO2 SERPL-SCNC: 29 MMOL/L (ref 21–32)
COLOR UR: YELLOW
CREAT SERPL-MCNC: 0.6 MG/DL (ref 0.6–1.3)
DEPRECATED SQUAMOUS URNS QL MICRO: (no result) /HPF
EOSINOPHIL NFR BLD AUTO: 0.2 % (ref 0–6)
GLUCOSE SERPL-MCNC: 128 MG/DL (ref 74–106)
HCT VFR BLD AUTO: 33 % (ref 33–45)
HGB BLD-MCNC: 10.7 G/DL (ref 11.5–14.8)
LIPASE SERPL-CCNC: 80 U/L (ref 73–393)
LYMPHOCYTES NFR BLD AUTO: 2.7 K/UL (ref 0.8–4.8)
LYMPHOCYTES NFR BLD AUTO: 33.5 % (ref 20–44)
MCHC RBC AUTO-ENTMCNC: 33 G/DL (ref 31–36)
MCV RBC AUTO: 90 FL (ref 82–100)
MONOCYTES NFR BLD AUTO: 0.8 K/UL (ref 0.1–1.3)
MONOCYTES NFR BLD AUTO: 10.4 % (ref 2–12)
NEUTROPHILS # BLD AUTO: 4.4 K/UL (ref 1.8–8.9)
NEUTROPHILS NFR BLD AUTO: 54.7 % (ref 43–81)
PH UR STRIP: 6 [PH] (ref 5–8)
PLATELET # BLD AUTO: 280 K/UL (ref 150–450)
POTASSIUM SERPL-SCNC: 2.6 MMOL/L (ref 3.5–5.1)
PROT SERPL-MCNC: 7.2 G/DL (ref 6.4–8.2)
RBC # BLD AUTO: 3.66 MIL/UL (ref 4–5.2)
RBC #/AREA URNS HPF: (no result) /HPF (ref 0–2)
SODIUM SERPL-SCNC: 141 MMOL/L (ref 136–145)
UROBILINOGEN UR STRIP-MCNC: 0.2 EU/DL
WBC #/AREA URNS HPF: (no result) /HPF (ref 0–3)
WBC NRBC COR # BLD AUTO: 8.1 K/UL (ref 4.3–11)

## 2021-07-21 PROCEDURE — C9803 HOPD COVID-19 SPEC COLLECT: HCPCS

## 2021-07-21 PROCEDURE — A9563 P32 NA PHOSPHATE: HCPCS

## 2021-07-21 PROCEDURE — U0003 INFECTIOUS AGENT DETECTION BY NUCLEIC ACID (DNA OR RNA); SEVERE ACUTE RESPIRATORY SYNDROME CORONAVIRUS 2 (SARS-COV-2) (CORONAVIRUS DISEASE [COVID-19]), AMPLIFIED PROBE TECHNIQUE, MAKING USE OF HIGH THROUGHPUT TECHNOLOGIES AS DESCRIBED BY CMS-2020-01-R: HCPCS

## 2021-07-21 PROCEDURE — A9537 TC99M MEBROFENIN: HCPCS

## 2021-07-21 PROCEDURE — G0378 HOSPITAL OBSERVATION PER HR: HCPCS

## 2021-07-21 PROCEDURE — C9113 INJ PANTOPRAZOLE SODIUM, VIA: HCPCS

## 2021-07-21 RX ADMIN — MAGNESIUM SULFATE IN DEXTROSE SCH MLS/HR: 10 INJECTION, SOLUTION INTRAVENOUS at 21:00

## 2021-07-21 RX ADMIN — MAGNESIUM SULFATE IN DEXTROSE SCH MLS/HR: 10 INJECTION, SOLUTION INTRAVENOUS at 18:14

## 2021-07-21 RX ADMIN — MAGNESIUM SULFATE IN DEXTROSE SCH MLS/HR: 10 INJECTION, SOLUTION INTRAVENOUS at 19:11

## 2021-07-21 RX ADMIN — POTASSIUM CHLORIDE SCH MLS/HR: 200 INJECTION, SOLUTION INTRAVENOUS at 17:30

## 2021-07-21 RX ADMIN — MAGNESIUM SULFATE IN DEXTROSE SCH MLS/HR: 10 INJECTION, SOLUTION INTRAVENOUS at 20:00

## 2021-07-21 RX ADMIN — POTASSIUM CHLORIDE SCH MLS/HR: 200 INJECTION, SOLUTION INTRAVENOUS at 19:00

## 2021-07-21 NOTE — NUR
PT ARRIVED WITH ABDOMINAL PAIN, UNABLE TO TOLERATE FOOD AND WEAKNESS FOR MONTHS 
WAS HOSPITALIZED IN [place] FOR 3 WEEKS. ALERT & ORIENTED X4.

## 2021-07-21 NOTE — NUR
MS RN NOTES



RECEIVED REPORT FROM [facility] ER NURSE.





RECEIVED PATIENT VIA AKSHATRSEAMUS. ACCOMPANIED BY DAUGHTER. A/OX4. NO S/S OF APPARENT DISTRESS. NO 
C/O OF PAIN AT THIS TIME. BLE EDEMA NOTED NON-PITTING. MAGNESIUM AND POTASSIUM RUNNING AT 
THIS TIME -- STARTED IN THE ER. WILL KEEP NPO FOR TONIGHT. WILL CONTINUE TO MONITOR.

## 2021-07-22 VITALS — SYSTOLIC BLOOD PRESSURE: 116 MMHG | DIASTOLIC BLOOD PRESSURE: 61 MMHG

## 2021-07-22 VITALS — DIASTOLIC BLOOD PRESSURE: 47 MMHG | SYSTOLIC BLOOD PRESSURE: 113 MMHG

## 2021-07-22 VITALS — DIASTOLIC BLOOD PRESSURE: 55 MMHG | SYSTOLIC BLOOD PRESSURE: 109 MMHG

## 2021-07-22 VITALS — DIASTOLIC BLOOD PRESSURE: 58 MMHG | SYSTOLIC BLOOD PRESSURE: 122 MMHG

## 2021-07-22 LAB
ALBUMIN SERPL BCP-MCNC: 1.6 G/DL (ref 3.4–5)
ALP SERPL-CCNC: 66 U/L (ref 46–116)
ALT SERPL W P-5'-P-CCNC: 12 U/L (ref 12–78)
AST SERPL W P-5'-P-CCNC: 30 U/L (ref 15–37)
BASOPHILS # BLD AUTO: 0 K/UL (ref 0–0.2)
BASOPHILS NFR BLD AUTO: 0.7 % (ref 0–2)
BILIRUB DIRECT SERPL-MCNC: 0 MG/DL (ref 0–0.2)
BILIRUB SERPL-MCNC: 0.6 MG/DL (ref 0.2–1)
BUN SERPL-MCNC: 5 MG/DL (ref 7–18)
CALCIUM SERPL-MCNC: 7.3 MG/DL (ref 8.5–10.1)
CHLORIDE SERPL-SCNC: 111 MMOL/L (ref 98–107)
CHOLEST SERPL-MCNC: 83 MG/DL (ref ?–200)
CO2 SERPL-SCNC: 28 MMOL/L (ref 21–32)
CREAT SERPL-MCNC: 0.5 MG/DL (ref 0.6–1.3)
EOSINOPHIL NFR BLD AUTO: 0.9 % (ref 0–6)
FERRITIN SERPL-MCNC: 440 NG/ML (ref 8–388)
GLUCOSE SERPL-MCNC: 94 MG/DL (ref 74–106)
HCT VFR BLD AUTO: 27 % (ref 33–45)
HDLC SERPL-MCNC: 22 MG/DL (ref 40–60)
HGB BLD-MCNC: 9 G/DL (ref 11.5–14.8)
IRON SERPL-MCNC: 22 UG/DL (ref 50–175)
LDLC SERPL DIRECT ASSAY-MCNC: 46 MG/DL (ref 0–99)
LYMPHOCYTES NFR BLD AUTO: 1.7 K/UL (ref 0.8–4.8)
LYMPHOCYTES NFR BLD AUTO: 24.9 % (ref 20–44)
MAGNESIUM SERPL-MCNC: 2.1 MG/DL (ref 1.8–2.4)
MCHC RBC AUTO-ENTMCNC: 33 G/DL (ref 31–36)
MCV RBC AUTO: 90 FL (ref 82–100)
MONOCYTES NFR BLD AUTO: 0.7 K/UL (ref 0.1–1.3)
MONOCYTES NFR BLD AUTO: 10.1 % (ref 2–12)
NEUTROPHILS # BLD AUTO: 4.3 K/UL (ref 1.8–8.9)
NEUTROPHILS NFR BLD AUTO: 63.4 % (ref 43–81)
PHOSPHATE SERPL-MCNC: 1.7 MG/DL (ref 2.5–4.9)
PLATELET # BLD AUTO: 216 K/UL (ref 150–450)
POTASSIUM SERPL-SCNC: 3.8 MMOL/L (ref 3.5–5.1)
PROT SERPL-MCNC: 5.9 G/DL (ref 6.4–8.2)
RBC # BLD AUTO: 3.05 MIL/UL (ref 4–5.2)
SODIUM SERPL-SCNC: 143 MMOL/L (ref 136–145)
TIBC SERPL-MCNC: 110 UG/DL (ref 250–450)
TRIGL SERPL-MCNC: 65 MG/DL (ref 30–150)
TSH SERPL DL<=0.005 MIU/L-ACNC: 0.91 UIU/ML (ref 0.36–3.74)
TSH SERPL DL<=0.005 MIU/L-ACNC: 0.93 UIU/ML (ref 0.36–3.74)
WBC NRBC COR # BLD AUTO: 6.7 K/UL (ref 4.3–11)

## 2021-07-22 RX ADMIN — LOSARTAN POTASSIUM SCH MG: 25 TABLET, FILM COATED ORAL at 08:44

## 2021-07-22 RX ADMIN — SODIUM CHLORIDE SCH MG: 9 INJECTION, SOLUTION INTRAVENOUS at 08:44

## 2021-07-22 RX ADMIN — DEXTROSE MONOHYDRATE SCH MLS/HR: 50 INJECTION, SOLUTION INTRAVENOUS at 11:46

## 2021-07-22 RX ADMIN — PIPERACILLIN SODIUM AND TAZOBACTAM SODIUM SCH MLS/HR: .375; 3 INJECTION, POWDER, LYOPHILIZED, FOR SOLUTION INTRAVENOUS at 18:25

## 2021-07-22 RX ADMIN — DEXTROSE MONOHYDRATE PRN MG: 50 INJECTION, SOLUTION INTRAVENOUS at 02:20

## 2021-07-22 RX ADMIN — BUPROPION HYDROCHLORIDE SCH MG: 150 TABLET, EXTENDED RELEASE ORAL at 08:45

## 2021-07-22 RX ADMIN — PIPERACILLIN SODIUM AND TAZOBACTAM SODIUM SCH MLS/HR: .375; 3 INJECTION, POWDER, LYOPHILIZED, FOR SOLUTION INTRAVENOUS at 20:09

## 2021-07-22 NOTE — NUR
MS RN NOTES



PATIENT C/O NAUSEA. GIVEN ZOFRAN 2ML PRN AT THIS TIME. WILL REASSESS AND CONTINUE TO 
MONITOR.

## 2021-07-22 NOTE — NUR
RN NOTES

PATIENT IS ALERT AND ORIENTED X3. PATIENT IN ON ROOM AIR. PATIENT IN NO APPARENT RESPIRATORY 
DISTRESS. NO COMPLAINED OF PAIN NOTED AT THIS TIME. CAME BACK IN THE UNIT VIA WHEELCHAIR.

## 2021-07-22 NOTE — NUR
RN NOTES

PATIENT IS ALERT AND ORIENTED X3. PATIENT IN ON ROOM AIR. PATIENT IN NO APPARENT RESPIRATORY 
DISTRESS. NO COMPLAINED OF PAIN NOTED AT THIS TIME.  BY MIRANDA FOR CT SCAN.

## 2021-07-22 NOTE — NUR
MS/RN CLOSING NOTES

PATIENT IS ON BED AWAKE ALERT AND ORIENTED X3. PATIENT IS ON ROOM AIR SATURATION 97%. 
PATIENT IN  NO APPARENT RESPIRATORY DISTRESS NOTED AT THIS TIME. SEEN AND EXAMINED BY MD 
WITH ORDERS MADE AND CARRIED OUT. ALL DUE MEDICATIONS WAS GIVEN. SAFETY PRECAUTIONS WAS IN 
PLACED.  SIDE RAILS UP X2. CALL LIGHT WITHIN REACH. WILL ENDORSED TO NIGHT SHIFT FOR OSWALDO.

## 2021-07-22 NOTE — NUR
MS/RN OPENING NOTES

RECEIVED PATIENT IN BED AWAKE ALERT AND ORIENTED X3. PATIENT IS IN ROOM AIR SATURATING WELL. 
PATIENT IN NO APPARENT DISTRESS NOTED.  NO COMPLAINED OF PAIN NOTED AT THIS TIME.  WILL 
CONTINUE TO MONITOR.

## 2021-07-22 NOTE — NUR
RN CLOSING NOTES



PATIENT IN BED SLEEPING COMFORTABLY, EASY TO AROUSE. NO S/S OF DISTRESS. NO C/O PAIN. ALL 
NEEDS ATTENDED. CALLED PATIENT'S DAUGHTER (KAYLEE) TO BRING ALL DOCUMENTATIONS OF PATIENT'S 
FROM [facility] PER DOCTORS ORDER, KAYLEE SAID SHE WILL BRING EVERYTHING SHE HAS ON HAND. NO 
SIGNIFICANT CHANGE SINCE LAST NIGHT. WILL ENDORSE TO MORNING SHIFT RN .

## 2021-07-23 VITALS — DIASTOLIC BLOOD PRESSURE: 57 MMHG | SYSTOLIC BLOOD PRESSURE: 112 MMHG

## 2021-07-23 LAB
ALBUMIN SERPL BCP-MCNC: 1.6 G/DL (ref 3.4–5)
ALP SERPL-CCNC: 71 U/L (ref 46–116)
ALT SERPL W P-5'-P-CCNC: 15 U/L (ref 12–78)
AST SERPL W P-5'-P-CCNC: 27 U/L (ref 15–37)
BASOPHILS # BLD AUTO: 0 K/UL (ref 0–0.2)
BASOPHILS NFR BLD AUTO: 0.6 % (ref 0–2)
BILIRUB SERPL-MCNC: 0.7 MG/DL (ref 0.2–1)
BUN SERPL-MCNC: 5 MG/DL (ref 7–18)
CALCIUM SERPL-MCNC: 7.7 MG/DL (ref 8.5–10.1)
CHLORIDE SERPL-SCNC: 108 MMOL/L (ref 98–107)
CO2 SERPL-SCNC: 27 MMOL/L (ref 21–32)
CREAT SERPL-MCNC: 0.6 MG/DL (ref 0.6–1.3)
EOSINOPHIL NFR BLD AUTO: 1.2 % (ref 0–6)
GLUCOSE SERPL-MCNC: 91 MG/DL (ref 74–106)
HCT VFR BLD AUTO: 28 % (ref 33–45)
HGB BLD-MCNC: 9.2 G/DL (ref 11.5–14.8)
LYMPHOCYTES NFR BLD AUTO: 1.7 K/UL (ref 0.8–4.8)
LYMPHOCYTES NFR BLD AUTO: 23.9 % (ref 20–44)
MAGNESIUM SERPL-MCNC: 1.6 MG/DL (ref 1.8–2.4)
MCHC RBC AUTO-ENTMCNC: 33 G/DL (ref 31–36)
MCV RBC AUTO: 89 FL (ref 82–100)
MONOCYTES NFR BLD AUTO: 0.7 K/UL (ref 0.1–1.3)
MONOCYTES NFR BLD AUTO: 10.1 % (ref 2–12)
NEUTROPHILS # BLD AUTO: 4.6 K/UL (ref 1.8–8.9)
NEUTROPHILS NFR BLD AUTO: 64.2 % (ref 43–81)
PHOSPHATE SERPL-MCNC: 2.7 MG/DL (ref 2.5–4.9)
PLATELET # BLD AUTO: 226 K/UL (ref 150–450)
POTASSIUM SERPL-SCNC: 3.2 MMOL/L (ref 3.5–5.1)
PROT SERPL-MCNC: 5.9 G/DL (ref 6.4–8.2)
RBC # BLD AUTO: 3.12 MIL/UL (ref 4–5.2)
SODIUM SERPL-SCNC: 140 MMOL/L (ref 136–145)
WBC NRBC COR # BLD AUTO: 7.1 K/UL (ref 4.3–11)

## 2021-07-23 RX ADMIN — BUPROPION HYDROCHLORIDE SCH MG: 150 TABLET, EXTENDED RELEASE ORAL at 08:41

## 2021-07-23 RX ADMIN — DEXTROSE MONOHYDRATE SCH MLS/HR: 50 INJECTION, SOLUTION INTRAVENOUS at 00:55

## 2021-07-23 RX ADMIN — SODIUM CHLORIDE SCH MG: 9 INJECTION, SOLUTION INTRAVENOUS at 08:41

## 2021-07-23 RX ADMIN — MAGNESIUM SULFATE IN DEXTROSE SCH MLS/HR: 10 INJECTION, SOLUTION INTRAVENOUS at 14:09

## 2021-07-23 RX ADMIN — LOSARTAN POTASSIUM SCH MG: 25 TABLET, FILM COATED ORAL at 08:42

## 2021-07-23 RX ADMIN — PIPERACILLIN SODIUM AND TAZOBACTAM SODIUM SCH MLS/HR: .375; 3 INJECTION, POWDER, LYOPHILIZED, FOR SOLUTION INTRAVENOUS at 16:42

## 2021-07-23 RX ADMIN — POTASSIUM CHLORIDE SCH MLS/HR: 200 INJECTION, SOLUTION INTRAVENOUS at 18:19

## 2021-07-23 RX ADMIN — PIPERACILLIN SODIUM AND TAZOBACTAM SODIUM SCH MLS/HR: .375; 3 INJECTION, POWDER, LYOPHILIZED, FOR SOLUTION INTRAVENOUS at 05:08

## 2021-07-23 RX ADMIN — DEXTROSE MONOHYDRATE SCH MLS/HR: 50 INJECTION, SOLUTION INTRAVENOUS at 17:19

## 2021-07-23 RX ADMIN — SODIUM CHLORIDE SCH MLS/HR: 9 INJECTION, SOLUTION INTRAVENOUS at 19:29

## 2021-07-23 RX ADMIN — POTASSIUM CHLORIDE SCH MLS/HR: 200 INJECTION, SOLUTION INTRAVENOUS at 14:09

## 2021-07-23 RX ADMIN — POTASSIUM CHLORIDE SCH MLS/HR: 200 INJECTION, SOLUTION INTRAVENOUS at 19:29

## 2021-07-23 RX ADMIN — MAGNESIUM SULFATE IN DEXTROSE SCH MLS/HR: 10 INJECTION, SOLUTION INTRAVENOUS at 16:42

## 2021-07-23 RX ADMIN — POTASSIUM CHLORIDE SCH MLS/HR: 200 INJECTION, SOLUTION INTRAVENOUS at 16:45

## 2021-07-23 RX ADMIN — PIPERACILLIN SODIUM AND TAZOBACTAM SODIUM SCH MLS/HR: .375; 3 INJECTION, POWDER, LYOPHILIZED, FOR SOLUTION INTRAVENOUS at 20:57

## 2021-07-23 NOTE — NUR
MS RN NOTES



PATIENT SEEN BY DR. PUGA AND NP WILLI. PATIENT'S DAUGHTER AT BEDSIDE. MD SAID THAT 
PATIENT MAY BE DISCHARGED AND TRANSFEERD TO ANOTHER HOSPITAL AND TO RELAY IT TO DR. ARROYO. 
DR. ARROYO NOTIFIED OF GI DOCTOR'S MESSAGE. WILL CONTINUE TO MONITOR PATIENT.

## 2021-07-23 NOTE — NUR
MS RN CLOSING NOTE



PATIENT ON BED ALERT AND ORIENTED X3, SPEAKS ONLY Yi. PATIENT IS ON ROOM AIR SATURATION 
97%. PATIENT IN  NO APPARENT RESPIRATORY DISTRESS NOTED AT THIS TIME. PATIENT WITH IV ACCESS 
PATENT AND INTACT. COMFORT MEASURES PROVIDED. SAFETY MEASURES ENSURED WITH BED LOCKED AND AT 
LOWEST POSITION AND  SIDE RAILS UP X2. CALL LIGHT AND BADSIDE TABLE WITHIN REACH AT ALL 
TIMES. WILL CONTINUE TO MONITOR PATIENT.

-------------------------------------------------------------------------------

Addendum: 07/23/21 at 2105 by CASSIE BAEZ RN

-------------------------------------------------------------------------------

OPENING NOTE

## 2021-07-23 NOTE — NUR
MS RN NOTE



PER DR. ROTHMAN OF GI, PATIENT MAY ADVANCE TO SOFT DIET PER PATIENT'S REQUEST. ORDERS MADE AND 
CARRIED OUT. SHE WAS ALSO MADE AWARE THAT PER  KAREN, NOTES SHOULD MENTION THE 
PROCEDURE THAT CANNOT BE DONE HERE FOR TRANSFER TO ANOTHER HOSPITAL. WILL WAIT FOR ANY 
ADVICE, INSTRUCTIONS.

## 2021-07-23 NOTE — NUR
MS RN NOTES



AWAKE & RESPONSIVE. NOT IN ANY DISTRESS. NO SOB NOTED. DENIES ANY PAIN OR DISCOMFORT AT THIS 
TIME. AM CARE DONE. MONITORED ACCORDINGLY. CALL LIGHT WITHIN REACH. BED IN LOWEST POSITION. 
SR UP X 3 WITH BED ALARM ON FOR SAFETY. WILL ENDORSE TO NEXT SHIFT.

## 2021-07-23 NOTE — NUR
MS RN OPENING NOTES:



RECEIVED PATIENT AWAKE IN BED, BED IN LOW POSITION, CALL LIGHTS WITHIN REACH, NO COMPLAIN OF 
PAIN AND DISCOMFORT, NO SOB OR ANY RESP. DISTRESS NOTED,  PATIENT IS A/O X3 Norwegian 
SPEAKING, AMBULATORY WITH ASSISTANCE,  KEPT CLEAN AND DRY, WILL CONTINUE TO MONITOR.

## 2021-07-23 NOTE — NUR
MS RN CLOSING NOTE



PATIENT ON BED ALERT AND ORIENTED X3, SPEAKS ONLY Setswana. PATIENT IS ON ROOM AIR SATURATION 
97%. PATIENT IN  NO APPARENT RESPIRATORY DISTRESS NOTED AT THIS TIME. POTASSIUM AND 
MAGNESIUM CORRECTION DONE AS ORDERED. PATIENT WITH IV ACCESS PATENT AND INTACT. COMFORT 
MEASURES PROVIDED. SAFETY MEASURES ENSURED WITH BED LOCKED AND AT LOWEST POSITION AND  SIDE 
RAILS UP X2. CALL LIGHT AND BEDSIDE TABLE WITHIN REACH AT ALL TIMES. WILL ENDORSE TO NEXT 
SHIFT FOR CONTINUITY OF CARE.

## 2021-07-24 VITALS — SYSTOLIC BLOOD PRESSURE: 113 MMHG | DIASTOLIC BLOOD PRESSURE: 65 MMHG

## 2021-07-24 VITALS — SYSTOLIC BLOOD PRESSURE: 109 MMHG | DIASTOLIC BLOOD PRESSURE: 55 MMHG

## 2021-07-24 VITALS — SYSTOLIC BLOOD PRESSURE: 100 MMHG | DIASTOLIC BLOOD PRESSURE: 39 MMHG

## 2021-07-24 LAB
AFP-TM SERPL-MCNC: 1.2 NG/ML (ref 0–8.3)
BASOPHILS # BLD AUTO: 0.1 K/UL (ref 0–0.2)
BASOPHILS NFR BLD AUTO: 0.9 % (ref 0–2)
BUN SERPL-MCNC: 6 MG/DL (ref 7–18)
CALCIUM SERPL-MCNC: 7.9 MG/DL (ref 8.5–10.1)
CANCER AG125 SERPL-ACNC: 121 U/ML (ref 0–38.1)
CANCER AG15-3 SERPL-ACNC: 19.9 U/ML (ref 0–25)
CANCER AG19-9 SERPL-ACNC: <2 U/ML (ref 0–35)
CHLORIDE SERPL-SCNC: 108 MMOL/L (ref 98–107)
CO2 SERPL-SCNC: 29 MMOL/L (ref 21–32)
CREAT SERPL-MCNC: 0.8 MG/DL (ref 0.6–1.3)
EOSINOPHIL NFR BLD AUTO: 0.8 % (ref 0–6)
GLUCOSE SERPL-MCNC: 104 MG/DL (ref 74–106)
HCT VFR BLD AUTO: 29 % (ref 33–45)
HGB BLD-MCNC: 9.6 G/DL (ref 11.5–14.8)
IGA SERPL-MCNC: 336 MG/DL (ref 87–352)
IGM SERPL-MCNC: 67 MG/DL (ref 26–217)
LYMPHOCYTES NFR BLD AUTO: 1.8 K/UL (ref 0.8–4.8)
LYMPHOCYTES NFR BLD AUTO: 21.9 % (ref 20–44)
MAGNESIUM SERPL-MCNC: 2 MG/DL (ref 1.8–2.4)
MCHC RBC AUTO-ENTMCNC: 34 G/DL (ref 31–36)
MCV RBC AUTO: 89 FL (ref 82–100)
MONOCYTES NFR BLD AUTO: 0.8 K/UL (ref 0.1–1.3)
MONOCYTES NFR BLD AUTO: 9.4 % (ref 2–12)
NEUTROPHILS # BLD AUTO: 5.6 K/UL (ref 1.8–8.9)
NEUTROPHILS NFR BLD AUTO: 67 % (ref 43–81)
PHOSPHATE SERPL-MCNC: 2.9 MG/DL (ref 2.5–4.9)
PLATELET # BLD AUTO: 232 K/UL (ref 150–450)
POTASSIUM SERPL-SCNC: 3.5 MMOL/L (ref 3.5–5.1)
RBC # BLD AUTO: 3.2 MIL/UL (ref 4–5.2)
SODIUM SERPL-SCNC: 140 MMOL/L (ref 136–145)
WBC NRBC COR # BLD AUTO: 8.4 K/UL (ref 4.3–11)

## 2021-07-24 PROCEDURE — 05HC33Z INSERTION OF INFUSION DEVICE INTO LEFT BASILIC VEIN, PERCUTANEOUS APPROACH: ICD-10-PCS | Performed by: NURSE PRACTITIONER

## 2021-07-24 RX ADMIN — BUPROPION HYDROCHLORIDE SCH MG: 150 TABLET, EXTENDED RELEASE ORAL at 08:39

## 2021-07-24 RX ADMIN — PIPERACILLIN SODIUM AND TAZOBACTAM SODIUM SCH MLS/HR: .375; 3 INJECTION, POWDER, LYOPHILIZED, FOR SOLUTION INTRAVENOUS at 04:30

## 2021-07-24 RX ADMIN — DEXTROSE MONOHYDRATE SCH MLS/HR: 50 INJECTION, SOLUTION INTRAVENOUS at 17:24

## 2021-07-24 RX ADMIN — LOSARTAN POTASSIUM SCH MG: 25 TABLET, FILM COATED ORAL at 08:40

## 2021-07-24 RX ADMIN — DEXTROSE MONOHYDRATE SCH MLS/HR: 50 INJECTION, SOLUTION INTRAVENOUS at 07:07

## 2021-07-24 RX ADMIN — PIPERACILLIN SODIUM AND TAZOBACTAM SODIUM SCH MLS/HR: .375; 3 INJECTION, POWDER, LYOPHILIZED, FOR SOLUTION INTRAVENOUS at 12:37

## 2021-07-24 RX ADMIN — SODIUM CHLORIDE SCH MG: 9 INJECTION, SOLUTION INTRAVENOUS at 08:39

## 2021-07-24 RX ADMIN — PIPERACILLIN SODIUM AND TAZOBACTAM SODIUM SCH MLS/HR: .375; 3 INJECTION, POWDER, LYOPHILIZED, FOR SOLUTION INTRAVENOUS at 20:29

## 2021-07-24 RX ADMIN — SODIUM CHLORIDE SCH MLS/HR: 9 INJECTION, SOLUTION INTRAVENOUS at 13:29

## 2021-07-24 NOTE — NUR
RN OPENING NOTE



PT AWAKE IN BED RESTING. A/O X 4 AND Scottish SPEAKING.  NEEDED. NO COMPLAINT OF 
PAIN OR NAUSEA PRESENT. ON RA WITH NO SOB OR RESPIRATORY DISTRESS PREENT. NO CARDIAC MONITOR 
PRESENT. NO EDEMA PRESENT. SELF AMBULATORY WITH ASSIST WITH BATHROOM PRIVILEGES. SKIN IS 
INTACT. IV PRESENT ON R HAND 22G AND FLUSHES WELL. IV PRESENT ON R FA 22G AND FLUSHES WELL. 
LABS AND ORDERS REVIEWED. SAFETY MEASURES IN PLACE. SIDE RAILS RAISED. BED LOWERED. CALL 
LIGHT WITHIN REACH. WILL CONTINUE TO MONITOR.

## 2021-07-24 NOTE — NUR
RN CLOSING NOTE



PT AWAKE IN BED RESTING. A/O X 4 AND Croatian SPEAKING.  NEEDED. NO COMPLAINT OF 
PAIN OR NAUSEA PRESENT. ON RA WITH NO SOB OR RESPIRATORY DISTRESS PRESENT. NO CARDIAC 
MONITOR PRESENT. NO EDEMA PRESENT. SELF AMBULATORY WITH ASSIST WITH BATHROOM PRIVILEGES. 
SKIN IS INTACT. IV PRESENT ON R HAND 22G AND FLUSHES WELL. MIDLINE PRESENT ON COLIN  AND 
FLUSHES WELL. LABS AND ORDERS REVIEWED. ROUTINE MEDS GIVEN. SAFETY MEASURES IN PLACE. SIDE 
RAILS RAISED. BED LOWERED. CALL LIGHT WITHIN REACH. REPORT GIVEN TO NIGHT NURSE FOR OSWALDO.

## 2021-07-24 NOTE — NUR
MS RN CLOSING NOTES:



PATIENT AWAKE IN BED, NO COMPLAIN OF PAIN AND DISCOMFORT, BED IN LOW POSITION, CALL LIGHTS 
WITHIN REACH, AMBULATORY WITH SUPERVISION, ON SOFT DIET, KEPT CLEAN AND DRY, ALL NEEDS MET 
ENDORSE TO INCOMING SHIFT.

## 2021-07-24 NOTE — NUR
MS RN OPENING NOTES:



RECEIVED PATIENT AWAKE WITH FAMILY, NO COMPLAIN OF PAIN AND DISCOMFORT, BED IN LOW POSITION, 
CALL LIGHTS WITHIN REACH, PATIENT IS A/OX3 Nauruan SPEAKING ABLE TO EXPRESS NEEDS, PATIENT 
KEPT CLEAN AND DRY, ALL NEEDS MET, WILL CONTINUE TO MONITOR.

## 2021-07-24 NOTE — NUR
RN NOTE



RECEIVED CALL FROM RADIOLOGY. HIDA SCAN UNABLE TO BE DONE ON WEEKENDS DUE TO NO STOCK OF 
ISOTOPE. CN NOTIFIED. WILL CONTINUE TO MONITOR.

## 2021-07-25 VITALS — SYSTOLIC BLOOD PRESSURE: 110 MMHG | DIASTOLIC BLOOD PRESSURE: 58 MMHG

## 2021-07-25 VITALS — DIASTOLIC BLOOD PRESSURE: 52 MMHG | SYSTOLIC BLOOD PRESSURE: 106 MMHG

## 2021-07-25 VITALS — SYSTOLIC BLOOD PRESSURE: 117 MMHG | DIASTOLIC BLOOD PRESSURE: 65 MMHG

## 2021-07-25 LAB
ALBUMIN SERPL BCP-MCNC: 1.8 G/DL (ref 3.4–5)
ALP SERPL-CCNC: 87 U/L (ref 46–116)
ALT SERPL W P-5'-P-CCNC: 16 U/L (ref 12–78)
AST SERPL W P-5'-P-CCNC: 32 U/L (ref 15–37)
BASOPHILS # BLD AUTO: 0 K/UL (ref 0–0.2)
BASOPHILS NFR BLD AUTO: 0.6 % (ref 0–2)
BILIRUB SERPL-MCNC: 0.7 MG/DL (ref 0.2–1)
BUN SERPL-MCNC: 7 MG/DL (ref 7–18)
CALCIUM SERPL-MCNC: 8 MG/DL (ref 8.5–10.1)
CHLORIDE SERPL-SCNC: 106 MMOL/L (ref 98–107)
CO2 SERPL-SCNC: 27 MMOL/L (ref 21–32)
CREAT SERPL-MCNC: 1 MG/DL (ref 0.6–1.3)
EOSINOPHIL NFR BLD AUTO: 0.7 % (ref 0–6)
GLUCOSE SERPL-MCNC: 113 MG/DL (ref 74–106)
HCT VFR BLD AUTO: 30 % (ref 33–45)
HGB BLD-MCNC: 9.7 G/DL (ref 11.5–14.8)
LYMPHOCYTES NFR BLD AUTO: 2 K/UL (ref 0.8–4.8)
LYMPHOCYTES NFR BLD AUTO: 23.8 % (ref 20–44)
MAGNESIUM SERPL-MCNC: 1.8 MG/DL (ref 1.8–2.4)
MCHC RBC AUTO-ENTMCNC: 33 G/DL (ref 31–36)
MCV RBC AUTO: 91 FL (ref 82–100)
MONOCYTES NFR BLD AUTO: 0.7 K/UL (ref 0.1–1.3)
MONOCYTES NFR BLD AUTO: 8.6 % (ref 2–12)
NEUTROPHILS # BLD AUTO: 5.6 K/UL (ref 1.8–8.9)
NEUTROPHILS NFR BLD AUTO: 66.3 % (ref 43–81)
PHOSPHATE SERPL-MCNC: 2.4 MG/DL (ref 2.5–4.9)
PLATELET # BLD AUTO: 217 K/UL (ref 150–450)
POTASSIUM SERPL-SCNC: 3.3 MMOL/L (ref 3.5–5.1)
PROT SERPL-MCNC: 6.4 G/DL (ref 6.4–8.2)
RBC # BLD AUTO: 3.28 MIL/UL (ref 4–5.2)
SODIUM SERPL-SCNC: 140 MMOL/L (ref 136–145)
WBC NRBC COR # BLD AUTO: 8.5 K/UL (ref 4.3–11)

## 2021-07-25 RX ADMIN — PIPERACILLIN SODIUM AND TAZOBACTAM SODIUM SCH MLS/HR: .375; 3 INJECTION, POWDER, LYOPHILIZED, FOR SOLUTION INTRAVENOUS at 04:14

## 2021-07-25 RX ADMIN — SODIUM CHLORIDE SCH MG: 9 INJECTION, SOLUTION INTRAVENOUS at 09:24

## 2021-07-25 RX ADMIN — PIPERACILLIN SODIUM AND TAZOBACTAM SODIUM SCH MLS/HR: .375; 3 INJECTION, POWDER, LYOPHILIZED, FOR SOLUTION INTRAVENOUS at 20:41

## 2021-07-25 RX ADMIN — BUPROPION HYDROCHLORIDE SCH MG: 150 TABLET, EXTENDED RELEASE ORAL at 09:24

## 2021-07-25 RX ADMIN — LOSARTAN POTASSIUM SCH MG: 25 TABLET, FILM COATED ORAL at 09:00

## 2021-07-25 RX ADMIN — DEXTROSE MONOHYDRATE SCH MLS/HR: 50 INJECTION, SOLUTION INTRAVENOUS at 06:15

## 2021-07-25 RX ADMIN — SODIUM CHLORIDE SCH MLS/HR: 9 INJECTION, SOLUTION INTRAVENOUS at 13:52

## 2021-07-25 RX ADMIN — PIPERACILLIN SODIUM AND TAZOBACTAM SODIUM SCH MLS/HR: .375; 3 INJECTION, POWDER, LYOPHILIZED, FOR SOLUTION INTRAVENOUS at 12:12

## 2021-07-25 NOTE — NUR
MS RN KEISHA NOTES;





PATIENT WAS AWAKE IN BED, BED IN LOW POSITION, CALL LIGHTS WITHIN REACH, NO COMPLAIN OF PAIN 
AND DISCOMFORT AT THIS TIME, A/OX3 Rwandan SPEAKING, AMBULATORY WITH SUPERVISION, , WITH IV 
LINE AT RIGHT HAND, AND  COLIN MIDLINE INFUSING WELL, ON SOFT DIET , HIDA SCAN MOVWE TO 
MONDAY, PATIENT KEPT CLEAN NAD DRY, ALL NEEDS MET, ENDORSE TO INCOMING SHIFT.

## 2021-07-25 NOTE — NUR
N OPENING NOTES:



RECEIVED PATIENT AWAKE IN BED, BED IN LOW POSITION, CALL LIHTS WITHIN REACH, NO COMPLAIN OF 
PAIN AND DISCOMFORT AT THIS TIME, PATIENT WAS WITH FAMILY, A/OX 3 Citizen of [place] SPEAKING, 
AMBULATORY ON BRP WITH SUPERVISION,  WITH RT HAND IV LINE #22 AND COLIN ML INFUSING WELL, NO 
SOB NOTED, PATIENT KEPT CLEAN AND DRY WILL CONTINUE TO MONITOR.

## 2021-07-25 NOTE — NUR
RN CLOSING NOTE



PT AWAKE IN BED RESTING. A/O X 4 AND Setswana SPEAKING.  NEEDED. NO COMPLAINT OF 
PAIN OR NAUSEA PRESENT. ON RA WITH NO SOB OR RESPIRATORY DISTRESS PRESENT. NO CARDIAC 
MONITOR PRESENT. NO EDEMA PRESENT. SELF AMBULATORY WITH ASSIST WITH BATHROOM PRIVILEGES. 
SKIN IS INTACT. IV PRESENT ON R HAND 22G AND FLUSHES WELL. MIDLINE PRESENT ON COLIN  AND 
FLUSHES WELL. LABS AND ORDERS REVIEWED. ROUTINE MEDS GIVEN. SAFETY MEASURES IN PLACE. SIDE 
RAILS RAISED. BED LOWERED. CALL LIGHT WITHIN REACH. REPORT GIVEN TO NIGHT NURSE FOR OSWALDO.

## 2021-07-25 NOTE — NUR
RN CLOSING NOTE



PT AWAKE IN BED RESTING. A/O X 4 AND Tristanian SPEAKING.  NEEDED. NO COMPLAINT OF 
PAIN OR NAUSEA PRESENT. ON RA WITH NO SOB OR RESPIRATORY DISTRESS PRESENT. NO CARDIAC 
MONITOR PRESENT. NO EDEMA PRESENT. SELF AMBULATORY WITH ASSIST WITH BATHROOM PRIVILEGES. 
SKIN IS INTACT. IV PRESENT ON R HAND 22G AND FLUSHES WELL. MIDLINE PRESENT ON COLIN AND 
FLUSHES WELL. LABS AND ORDERS REVIEWED. SAFETY MEASURES IN PLACE. SIDE RAILS RAISED. BED 
LOWERED. CALL LIGHT WITHIN REACH. WILL CONTINUE TO MONITOR.

## 2021-07-26 VITALS — SYSTOLIC BLOOD PRESSURE: 102 MMHG | DIASTOLIC BLOOD PRESSURE: 52 MMHG

## 2021-07-26 VITALS — DIASTOLIC BLOOD PRESSURE: 68 MMHG | SYSTOLIC BLOOD PRESSURE: 105 MMHG

## 2021-07-26 VITALS — SYSTOLIC BLOOD PRESSURE: 124 MMHG | DIASTOLIC BLOOD PRESSURE: 67 MMHG

## 2021-07-26 VITALS — SYSTOLIC BLOOD PRESSURE: 106 MMHG | DIASTOLIC BLOOD PRESSURE: 52 MMHG

## 2021-07-26 LAB
ALBUMIN SERPL ELPH-MCNC: 2.1 G/DL (ref 2.9–4.4)
ALBUMIN/GLOB SERPL: 0.6 {RATIO} (ref 0.7–1.7)
ALPHA1 GLOB SERPL ELPH-MCNC: 0.3 G/DL (ref 0–0.4)
ALPHA2 GLOB SERPL ELPH-MCNC: 0.5 G/DL (ref 0.4–1)
B-GLOBULIN SERPL ELPH-MCNC: 0.7 G/DL (ref 0.7–1.3)
BASOPHILS # BLD AUTO: 0 K/UL (ref 0–0.2)
BASOPHILS NFR BLD AUTO: 0.6 % (ref 0–2)
BUN SERPL-MCNC: 8 MG/DL (ref 7–18)
CALCIUM SERPL-MCNC: 7.8 MG/DL (ref 8.5–10.1)
CHLORIDE SERPL-SCNC: 109 MMOL/L (ref 98–107)
CO2 SERPL-SCNC: 29 MMOL/L (ref 21–32)
CREAT SERPL-MCNC: 1 MG/DL (ref 0.6–1.3)
EOSINOPHIL NFR BLD AUTO: 0.9 % (ref 0–6)
GAMMA GLOB SERPL ELPH-MCNC: 2.4 G/DL (ref 0.4–1.8)
GLOBULIN SER CALC-MCNC: 3.8 G/DL (ref 2.2–3.9)
GLUCOSE SERPL-MCNC: 95 MG/DL (ref 74–106)
HCT VFR BLD AUTO: 26 % (ref 33–45)
HGB BLD-MCNC: 8.8 G/DL (ref 11.5–14.8)
LYMPHOCYTES NFR BLD AUTO: 1.7 K/UL (ref 0.8–4.8)
LYMPHOCYTES NFR BLD AUTO: 23.6 % (ref 20–44)
MAGNESIUM SERPL-MCNC: 1.8 MG/DL (ref 1.8–2.4)
MCHC RBC AUTO-ENTMCNC: 33 G/DL (ref 31–36)
MCV RBC AUTO: 91 FL (ref 82–100)
MONOCYTES NFR BLD AUTO: 0.7 K/UL (ref 0.1–1.3)
MONOCYTES NFR BLD AUTO: 9.8 % (ref 2–12)
NEUTROPHILS # BLD AUTO: 4.7 K/UL (ref 1.8–8.9)
NEUTROPHILS NFR BLD AUTO: 65.1 % (ref 43–81)
PHOSPHATE SERPL-MCNC: 3 MG/DL (ref 2.5–4.9)
PLATELET # BLD AUTO: 200 K/UL (ref 150–450)
POTASSIUM SERPL-SCNC: 3.3 MMOL/L (ref 3.5–5.1)
RBC # BLD AUTO: 2.91 MIL/UL (ref 4–5.2)
SODIUM SERPL-SCNC: 144 MMOL/L (ref 136–145)
WBC NRBC COR # BLD AUTO: 7.2 K/UL (ref 4.3–11)

## 2021-07-26 RX ADMIN — PIPERACILLIN SODIUM AND TAZOBACTAM SODIUM SCH MLS/HR: .375; 3 INJECTION, POWDER, LYOPHILIZED, FOR SOLUTION INTRAVENOUS at 20:20

## 2021-07-26 RX ADMIN — BUPROPION HYDROCHLORIDE SCH MG: 150 TABLET, EXTENDED RELEASE ORAL at 08:10

## 2021-07-26 RX ADMIN — Medication SCH ML: at 16:22

## 2021-07-26 RX ADMIN — SODIUM CHLORIDE SCH MG: 9 INJECTION, SOLUTION INTRAVENOUS at 11:49

## 2021-07-26 RX ADMIN — POTASSIUM CHLORIDE SCH MLS/HR: 200 INJECTION, SOLUTION INTRAVENOUS at 11:49

## 2021-07-26 RX ADMIN — PIPERACILLIN SODIUM AND TAZOBACTAM SODIUM SCH MLS/HR: .375; 3 INJECTION, POWDER, LYOPHILIZED, FOR SOLUTION INTRAVENOUS at 13:21

## 2021-07-26 RX ADMIN — Medication SCH ML: at 13:30

## 2021-07-26 RX ADMIN — LOSARTAN POTASSIUM SCH MG: 25 TABLET, FILM COATED ORAL at 08:10

## 2021-07-26 RX ADMIN — PIPERACILLIN SODIUM AND TAZOBACTAM SODIUM SCH MLS/HR: .375; 3 INJECTION, POWDER, LYOPHILIZED, FOR SOLUTION INTRAVENOUS at 04:47

## 2021-07-26 RX ADMIN — POTASSIUM CHLORIDE SCH MLS/HR: 200 INJECTION, SOLUTION INTRAVENOUS at 13:18

## 2021-07-26 RX ADMIN — DEXTROSE MONOHYDRATE PRN MG: 50 INJECTION, SOLUTION INTRAVENOUS at 20:19

## 2021-07-26 NOTE — NUR
MS RN NOTE - NAUSEA



PATIENT C/O NAUSEA NO EMESIS NOTED. ADMINISTERED ZOFRAN AS ORDERED. WILL CONTINUE TO 
REASSESS FOR N/V IN 1 HOUR.

## 2021-07-26 NOTE — NUR
MS RN OPENING NOTE



RECEIVED PT AWAKE IN BED, A/OX3, ABLE TO VERBALIZE NEEDS. DENIES ANY PAIN OR DISCOMFORT AT 
THIS TIME. ON ROOM AIR AND TOLERATING WELL. NOTED IV ACCESS ON RH #22g AND COLIN MIDLINE, 
INTACT, PATENT AND FLUSHES WELL. PT ON NPO FOR HIDA SCAN. PT MADE AWARE AND VERBALIZED 
UNDERSTANDING. SAFETY MEASURES MAINTAINED: BED IN LOWEST LOCKED POSITION, S/R UP X2, CALL 
LIGHT AND TABLE WITHIN EASY REACH. WILL CONTINUE TO MONITOR.

## 2021-07-26 NOTE — NUR
RN NOTES



HIDA SCAN DONE TODAY. INFORMED PINKY MEYER FOR RESULT. MD ORDERED TO START PT ON CLEAR 
LIQUIDS AT THIS TIME. PT AND FAMILY AWARE.

## 2021-07-26 NOTE — NUR
MS RN CLOSING NOTE



PT AWAKE IN BED WITH FAMILY AT BEDSIDE. A/OX3, ABLE TO VERBALIZE NEEDS. DENIES ANY PAIN OR 
DISCOMFORT AT THIS TIME. ON ROOM AIR AND TOLERATING WELL. IV ACCESS ON RH #22G AND COLIN 
MIDLINE, INTACT, PATENT, AND INFUSING D5 1/2 NS @100ML/HR AND TOLERATING WELL. PT WILL BE 
NPO POST-MIDNIGHT FOR CT-GUIDED ASPIRATION AND DRAINAGE OF PERITONEAL FLUID TOMORROW. PT AND 
FAMILY ARE AWARE. SAFETY MEASURES MAINTAINED: BED IN LOWEST LOCKED POSITION, S/R UP X2, CALL 
LIGHT AND TABLE WITHIN EASY REACH. WILL ENDORSE TO NEXT SHIFT NURSE.

## 2021-07-26 NOTE — NUR
RN NOTE



PT FOR CT-GUIDED ASPIRATION AND DRAINAGE OF PERITONEAL FLUID TOMORROW, PT AND FAMILY AT 
BEDSIDE MADE AWARE AND ALL CONSENTS SIGNED BY DAUGHTER, SHAUN. NPO TO BE ENFORCED 
POST-MIDNIGHT.

## 2021-07-26 NOTE — NUR
RN CLOSING NOTES

 PATIENT SLEEP IN BED COMFORTABLY, BED IN LOW POSITION, CALL LIGHTS WITHIN REACH, NO 
COMPLAIN OF PAIN AND DISCOMFORT AT THIS TIME, BED IN LOW POSITION, PATIENT IS A/O X4 Kazakh 
SPEAKING, AMBULATORY WITH SUPERVISIONWITH SCHEDULE HIDA SCAN,  PATIENT WITH RHAN #22 
INFUSING WELL, WITH COLIN ML,KEPT CLEAN ANDD RY, ALL NEEDS MET, WILL CONTINUE TO MONITOR.

## 2021-07-26 NOTE — NUR
MS RN OPENING NOTE



PATIENT A/OX3; ABLE TO MAKE NEEDS KNOWN. TOLERATING ROOM AIR WELL WITH NO SOB. DENIES PAIN 
OR DISCOMFORT AT THIS TIME. COLIN MIDLINE #18G D5 1/2NS @ 100ML/HR; PATENT AND INTACT. R HAND 
#22 S/L; PATENT AND INTACT. SAFETY MEASURES IN PLACE: BED IN LOWEST LOCKED POSITION, SIDE 
RAILS UPX2, CALL LIGHT WITHIN EASY REACH, BED ALARM ON. FAMILY AT BEDSIDE. PATIENT IN STABLE 
CONDITION, WILL CONTINUE PLAN OF CARE.

## 2021-07-27 VITALS — SYSTOLIC BLOOD PRESSURE: 104 MMHG | DIASTOLIC BLOOD PRESSURE: 58 MMHG

## 2021-07-27 LAB
ALBUMIN SERPL BCP-MCNC: 1.7 G/DL (ref 3.4–5)
ALP SERPL-CCNC: 97 U/L (ref 46–116)
ALT SERPL W P-5'-P-CCNC: 15 U/L (ref 12–78)
AST SERPL W P-5'-P-CCNC: 28 U/L (ref 15–37)
BASOPHILS # BLD AUTO: 0 K/UL (ref 0–0.2)
BASOPHILS NFR BLD AUTO: 0.8 % (ref 0–2)
BILIRUB SERPL-MCNC: 0.7 MG/DL (ref 0.2–1)
BUN SERPL-MCNC: 7 MG/DL (ref 7–18)
CALCIUM SERPL-MCNC: 8 MG/DL (ref 8.5–10.1)
CHLORIDE SERPL-SCNC: 109 MMOL/L (ref 98–107)
CO2 SERPL-SCNC: 28 MMOL/L (ref 21–32)
CREAT SERPL-MCNC: 1 MG/DL (ref 0.6–1.3)
EOSINOPHIL NFR BLD AUTO: 1.6 % (ref 0–6)
GLUCOSE SERPL-MCNC: 86 MG/DL (ref 74–106)
HCT VFR BLD AUTO: 28 % (ref 33–45)
HGB BLD-MCNC: 9.3 G/DL (ref 11.5–14.8)
LYMPHOCYTES NFR BLD AUTO: 1.7 K/UL (ref 0.8–4.8)
LYMPHOCYTES NFR BLD AUTO: 28.3 % (ref 20–44)
MAGNESIUM SERPL-MCNC: 1.8 MG/DL (ref 1.8–2.4)
MCHC RBC AUTO-ENTMCNC: 33 G/DL (ref 31–36)
MCV RBC AUTO: 91 FL (ref 82–100)
MONOCYTES NFR BLD AUTO: 0.6 K/UL (ref 0.1–1.3)
MONOCYTES NFR BLD AUTO: 9.6 % (ref 2–12)
NEUTROPHILS # BLD AUTO: 3.6 K/UL (ref 1.8–8.9)
NEUTROPHILS NFR BLD AUTO: 59.7 % (ref 43–81)
PHOSPHATE SERPL-MCNC: 2.8 MG/DL (ref 2.5–4.9)
PLATELET # BLD AUTO: 210 K/UL (ref 150–450)
POTASSIUM SERPL-SCNC: 3.7 MMOL/L (ref 3.5–5.1)
PROT SERPL-MCNC: 6.5 G/DL (ref 6.4–8.2)
RBC # BLD AUTO: 3.11 MIL/UL (ref 4–5.2)
SODIUM SERPL-SCNC: 142 MMOL/L (ref 136–145)
WBC NRBC COR # BLD AUTO: 6 K/UL (ref 4.3–11)

## 2021-07-27 PROCEDURE — 0W9F3ZZ DRAINAGE OF ABDOMINAL WALL, PERCUTANEOUS APPROACH: ICD-10-PCS

## 2021-07-27 RX ADMIN — Medication SCH ML: at 11:52

## 2021-07-27 RX ADMIN — Medication SCH ML: at 08:00

## 2021-07-27 RX ADMIN — SODIUM CHLORIDE SCH MG: 9 INJECTION, SOLUTION INTRAVENOUS at 08:24

## 2021-07-27 RX ADMIN — PIPERACILLIN SODIUM AND TAZOBACTAM SODIUM SCH MLS/HR: .375; 3 INJECTION, POWDER, LYOPHILIZED, FOR SOLUTION INTRAVENOUS at 20:23

## 2021-07-27 RX ADMIN — Medication SCH ML: at 11:58

## 2021-07-27 RX ADMIN — Medication SCH ML: at 17:00

## 2021-07-27 RX ADMIN — PIPERACILLIN SODIUM AND TAZOBACTAM SODIUM SCH MLS/HR: .375; 3 INJECTION, POWDER, LYOPHILIZED, FOR SOLUTION INTRAVENOUS at 11:52

## 2021-07-27 RX ADMIN — PIPERACILLIN SODIUM AND TAZOBACTAM SODIUM SCH MLS/HR: .375; 3 INJECTION, POWDER, LYOPHILIZED, FOR SOLUTION INTRAVENOUS at 03:41

## 2021-07-27 RX ADMIN — BUPROPION HYDROCHLORIDE SCH MG: 150 TABLET, EXTENDED RELEASE ORAL at 08:25

## 2021-07-27 RX ADMIN — LOSARTAN POTASSIUM SCH MG: 25 TABLET, FILM COATED ORAL at 08:25

## 2021-07-27 NOTE — NUR
ARGELIA ARRIAGA GAVE AN ORDER THRU TEXT MESSAGE TO PUT THE PT BACK ON CLEAR LIQUID DIET. ORDER 
CARRIED OUT.

## 2021-07-27 NOTE — NUR
RN CLOSING NOTE



PATIENT RESTING IN BED. A/O X4. NO SOB NOTED. IN NO APPARENT DISTRESS. STILL DENIES ANY PAIN 
OR DISCOMFORT AT THIS TIME. CURRENTLY ON SOFT DIET - TO ADVANCE DIET AS TOLERATED. IV ACCESS 
ON R HAND #22 G. COLIN MIDLINE #18 G, D5 1/2 NS X 100 ML/HR, INTACT AND PATENT. NO SIGNS OF 
INFILTRATION. ALL DUE MEDS GIVEN AS ORDERED. ALL NEEDS HAVE BEEN MET AND ATTENDED. SAFETY 
MEASURES MAINTAINED. BED IN LOWEST POSITION, BRAKES LOCKED. SIDE RAILS UP X2. KEPT CALL 
LIGHT WITHIN REACH. WILL ENDORSE CONTINUITY OF CARE TO ONCOMING SHIFT.

## 2021-07-27 NOTE — NUR
MS RN CLOSING NOTE



PATIENT A/OX3; ABLE TO MAKE NEEDS KNOWN. TOLERATING ROOM AIR WELL WITH NO SOB. DENIES PAIN 
OR DISCOMFORT AT THIS TIME. COLIN MIDLINE #18G D5 1/2NS @ 100ML/HR; PATENT AND INTACT. R HAND 
#22 S/L; PATENT AND INTACT. SAFETY MEASURES IN PLACE: BED IN LOWEST LOCKED POSITION, SIDE 
RAILS UPX2, CALL LIGHT WITHIN EASY REACH, BED ALARM ON. ENDORSED PLAN OF CARE.

## 2021-07-27 NOTE — NUR
RN OPENING NOTE



RECEIVED PATIENT IN BED. A/O X4. NO SOB NOTED. IN NO APPARENT DISTRESS. DENIES ANY PAIN OR 
DISCOMFORT AT THIS TIME. CURRENTLY ON NPO EXCEPT MEDS STATUS. IV ACCESS ON COLIN MIDLINE #18 
G, D5 1/2 NS X 100 ML/HR, INTACT AND PATENT. SAFETY MEASURES MAINTAINED. BED IN LOWEST 
POSITION, BRAKES LOCKED. SIDE RAILS UP X2. CALL LIGHT WITHIN REACH. WILL CONTINUE PLAN OF 
CARE. 

-------------------------------------------------------------------------------

Addendum: 07/27/21 at 0909 by KAN RUIZ RN

-------------------------------------------------------------------------------

MADE ROUNDS AT 0730

## 2021-07-27 NOTE — NUR
MSRN

FULLY AWAKE, FAMILY JUST LEFT. NO COMPLAINTS DENIES ANY TYPE OF DISCOMFORTS.  ALL NEEDS 
ATTENDED. EAGER TO GO HOME IN AM.  KEPT COMFORTABLE. CONTINUED

## 2021-07-27 NOTE — NUR
MSRN

ASSISTED TO [facility], VOIDED FREELY.  UNSTEADY GAIT. SAFETY PRECAUTIONS EMPHASIZED TRANSLATED IN 
Bengali BY STAFF,  BED ALARM ON.  CLOSELY WATCHED.

## 2021-07-28 VITALS — SYSTOLIC BLOOD PRESSURE: 123 MMHG | DIASTOLIC BLOOD PRESSURE: 68 MMHG

## 2021-07-28 LAB
BASOPHILS # BLD AUTO: 0.1 K/UL (ref 0–0.2)
BASOPHILS NFR BLD AUTO: 0.9 % (ref 0–2)
BUN SERPL-MCNC: 7 MG/DL (ref 7–18)
CALCIUM SERPL-MCNC: 7.9 MG/DL (ref 8.5–10.1)
CHLORIDE SERPL-SCNC: 109 MMOL/L (ref 98–107)
CO2 SERPL-SCNC: 26 MMOL/L (ref 21–32)
CREAT SERPL-MCNC: 1 MG/DL (ref 0.6–1.3)
EOSINOPHIL NFR BLD AUTO: 1.2 % (ref 0–6)
GLUCOSE SERPL-MCNC: 76 MG/DL (ref 74–106)
HCT VFR BLD AUTO: 27 % (ref 33–45)
HGB BLD-MCNC: 8.9 G/DL (ref 11.5–14.8)
LYMPHOCYTES NFR BLD AUTO: 1.8 K/UL (ref 0.8–4.8)
LYMPHOCYTES NFR BLD AUTO: 26.1 % (ref 20–44)
MCHC RBC AUTO-ENTMCNC: 33 G/DL (ref 31–36)
MCV RBC AUTO: 91 FL (ref 82–100)
MONOCYTES NFR BLD AUTO: 0.7 K/UL (ref 0.1–1.3)
MONOCYTES NFR BLD AUTO: 9.6 % (ref 2–12)
NEUTROPHILS # BLD AUTO: 4.3 K/UL (ref 1.8–8.9)
NEUTROPHILS NFR BLD AUTO: 62.2 % (ref 43–81)
PLATELET # BLD AUTO: 171 K/UL (ref 150–450)
POTASSIUM SERPL-SCNC: 4 MMOL/L (ref 3.5–5.1)
RBC # BLD AUTO: 2.96 MIL/UL (ref 4–5.2)
SODIUM SERPL-SCNC: 140 MMOL/L (ref 136–145)
WBC NRBC COR # BLD AUTO: 6.8 K/UL (ref 4.3–11)

## 2021-07-28 RX ADMIN — LOSARTAN POTASSIUM SCH MG: 25 TABLET, FILM COATED ORAL at 08:49

## 2021-07-28 RX ADMIN — SODIUM CHLORIDE SCH MG: 9 INJECTION, SOLUTION INTRAVENOUS at 08:49

## 2021-07-28 RX ADMIN — Medication SCH ML: at 08:49

## 2021-07-28 RX ADMIN — PIPERACILLIN SODIUM AND TAZOBACTAM SODIUM SCH MLS/HR: .375; 3 INJECTION, POWDER, LYOPHILIZED, FOR SOLUTION INTRAVENOUS at 04:59

## 2021-07-28 RX ADMIN — Medication SCH ML: at 11:00

## 2021-07-28 RX ADMIN — BUPROPION HYDROCHLORIDE SCH MG: 150 TABLET, EXTENDED RELEASE ORAL at 08:49

## 2021-07-28 RX ADMIN — PIPERACILLIN SODIUM AND TAZOBACTAM SODIUM SCH MLS/HR: .375; 3 INJECTION, POWDER, LYOPHILIZED, FOR SOLUTION INTRAVENOUS at 11:00

## 2021-07-28 NOTE — NUR
MS RN OPENING NOTE



RECEIVED PATIENT RESTING IN BED. PATIENT A/OX3; ABLE TO MAKE NEEDS KNOWN. PATIENT IS 
BREATHING EVENLY AND NONLABORED TOLERATING ROOM AIR WELL WITH NO SOB. DENIES PAIN OR 
DISCOMFORT AT THIS TIME. COLIN MIDLINE #18G  PATENT AND INTACT.  SAFETY MEASURES IN PLACE: BED 
IN LOWEST LOCKED POSITION, SIDE RAILS UPX2, CALL LIGHT WITHIN EASY REACH, BED ALARM ON.  
PATIENT IN STABLE CONDITION, WILL CONTINUE TO MONITOR

## 2021-07-28 NOTE — NUR
RN DISCHARGE NOTE



RECEIVED ORDER FOR DISCHARGE. PATIENT IS A/O X4. PATIENT BREATHING EVENLY AND NONLABORED ON 
ROOM AIR. NO SIGNS OF DISTRESS NOTED. PATIENT DOES NOT COMPLAIN OF ANY PAIN OR DISCOMFORT. 
DISCHARGE INSTRUCTIONS WERE GIVEN BOTH VERBALLY AND IN WRITTEN FORM. PATIENT VERBALIZES 
UNDERSTANDING. IV ACCESS WAS REMOVED CATHETER TIP INTACT AND PRESSURE DRESSING APPLIED. 
BELONGINGS ACCOUNTED FOR AND FORM SIGNED. PATIENT LEFT IN STABLE CONDITION WITH FAMILY

## 2022-11-19 ENCOUNTER — HOSPITAL ENCOUNTER (INPATIENT)
Dept: HOSPITAL 12 - ER | Age: 72
LOS: 2 days | Discharge: HOME HEALTH | DRG: 303 | End: 2022-11-21
Payer: MEDICARE

## 2022-11-19 VITALS — HEIGHT: 69 IN | WEIGHT: 174 LBS | BODY MASS INDEX: 25.77 KG/M2

## 2022-11-19 DIAGNOSIS — I25.10: Primary | ICD-10-CM

## 2022-11-19 DIAGNOSIS — Z79.82: ICD-10-CM

## 2022-11-19 DIAGNOSIS — Z20.822: ICD-10-CM

## 2022-11-19 DIAGNOSIS — F41.9: ICD-10-CM

## 2022-11-19 DIAGNOSIS — F32.A: ICD-10-CM

## 2022-11-19 DIAGNOSIS — I10: ICD-10-CM

## 2022-11-19 DIAGNOSIS — R51.9: ICD-10-CM

## 2022-11-19 LAB
ALP SERPL-CCNC: 112 U/L (ref 50–136)
ALT SERPL W/O P-5'-P-CCNC: 15 U/L (ref 14–59)
AST SERPL-CCNC: 17 U/L (ref 15–37)
BILIRUB DIRECT SERPL-MCNC: 0.1 MG/DL (ref 0–0.2)
BILIRUB SERPL-MCNC: 0.3 MG/DL (ref 0.2–1)
BUN SERPL-MCNC: 18 MG/DL (ref 7–18)
CHLORIDE SERPL-SCNC: 104 MMOL/L (ref 98–107)
CO2 SERPL-SCNC: 29 MMOL/L (ref 21–32)
CREAT SERPL-MCNC: 0.9 MG/DL (ref 0.6–1.3)
GLUCOSE SERPL-MCNC: 109 MG/DL (ref 74–106)
HCT VFR BLD AUTO: 40.2 % (ref 31.2–41.9)
MCH RBC QN AUTO: 29.8 UUG (ref 24.7–32.8)
MCV RBC AUTO: 90.5 FL (ref 75.5–95.3)
PLATELET # BLD AUTO: 215 K/UL (ref 179–408)
POTASSIUM SERPL-SCNC: 3.7 MMOL/L (ref 3.5–5.1)
WS STN SPEC: 8.2 G/DL (ref 6.4–8.2)

## 2022-11-19 PROCEDURE — G0378 HOSPITAL OBSERVATION PER HR: HCPCS

## 2022-11-19 PROCEDURE — A4663 DIALYSIS BLOOD PRESSURE CUFF: HCPCS

## 2022-11-20 VITALS — DIASTOLIC BLOOD PRESSURE: 57 MMHG | SYSTOLIC BLOOD PRESSURE: 133 MMHG

## 2022-11-20 VITALS — DIASTOLIC BLOOD PRESSURE: 48 MMHG | SYSTOLIC BLOOD PRESSURE: 111 MMHG

## 2022-11-20 VITALS — DIASTOLIC BLOOD PRESSURE: 47 MMHG | SYSTOLIC BLOOD PRESSURE: 118 MMHG

## 2022-11-20 VITALS — SYSTOLIC BLOOD PRESSURE: 133 MMHG | DIASTOLIC BLOOD PRESSURE: 57 MMHG

## 2022-11-20 VITALS — SYSTOLIC BLOOD PRESSURE: 138 MMHG | DIASTOLIC BLOOD PRESSURE: 62 MMHG

## 2022-11-20 VITALS — DIASTOLIC BLOOD PRESSURE: 56 MMHG | SYSTOLIC BLOOD PRESSURE: 121 MMHG

## 2022-11-20 LAB
APPEARANCE UR: CLEAR
BILIRUB UR QL STRIP: NEGATIVE
BUN SERPL-MCNC: 15 MG/DL (ref 7–18)
CHLORIDE SERPL-SCNC: 108 MMOL/L (ref 98–107)
CHOLEST SERPL-MCNC: 195 MG/DL (ref ?–200)
CO2 SERPL-SCNC: 27 MMOL/L (ref 21–32)
COLOR UR: YELLOW
CREAT SERPL-MCNC: 0.7 MG/DL (ref 0.6–1.3)
DEPRECATED SQUAMOUS URNS QL MICRO: (no result) /HPF
GLUCOSE SERPL-MCNC: 114 MG/DL (ref 74–106)
GLUCOSE UR STRIP-MCNC: NEGATIVE MG/DL
HCT VFR BLD AUTO: 36.7 % (ref 31.2–41.9)
HDLC SERPL-MCNC: 53 MG/DL (ref 40–60)
HGB UR QL STRIP: (no result)
KETONES UR STRIP-MCNC: NEGATIVE MG/DL
LEUKOCYTE ESTERASE UR QL STRIP: (no result)
MAGNESIUM SERPL-MCNC: 1.9 MG/DL (ref 1.8–2.4)
MCH RBC QN AUTO: 30.9 UUG (ref 24.7–32.8)
MCV RBC AUTO: 90.1 FL (ref 75.5–95.3)
NITRITE UR QL STRIP: NEGATIVE
PH UR STRIP: 5.5 [PH] (ref 5–8)
PHOSPHATE SERPL-MCNC: 3.5 MG/DL (ref 2.5–4.9)
PLATELET # BLD AUTO: 188 K/UL (ref 179–408)
POTASSIUM SERPL-SCNC: 3.5 MMOL/L (ref 3.5–5.1)
RBC #/AREA URNS HPF: (no result) /HPF (ref 0–3)
SP GR UR STRIP: <=1.005 (ref 1–1.03)
TRIGL SERPL-MCNC: 77 MG/DL (ref 30–150)
UROBILINOGEN UR STRIP-MCNC: 0.2 E.U./DL
WBC #/AREA URNS HPF: (no result) /HPF
WBC #/AREA URNS HPF: (no result) /HPF (ref 0–3)

## 2022-11-20 RX ADMIN — ASPIRIN SCH MG: 81 TABLET, CHEWABLE ORAL at 08:43

## 2022-11-20 RX ADMIN — PANTOPRAZOLE SODIUM SCH MG: 40 TABLET, DELAYED RELEASE ORAL at 06:20

## 2022-11-20 NOTE — NUR
RECEIVED REPORT FROM ER NURSE ROSA RN, CHIEF COMPLAINT WAS 

ANXIETY/PANICK ATTACK, DX CHEST PAIN. PATIENT DENIES PAIN AT THIS TIME. PATIENT TRANSPORTED 
VIA GURNEY FROM ER TO ROOM 311, COMPLETE ASSESSMENT COMPLETED.

## 2022-11-21 ENCOUNTER — HOSPITAL ENCOUNTER (OUTPATIENT)
Dept: HOSPITAL 54 - CT | Age: 72
Discharge: ANOTHER HOSPITAL | End: 2022-11-21
Attending: INTERNAL MEDICINE
Payer: MEDICARE

## 2022-11-21 VITALS — HEIGHT: 61 IN | BODY MASS INDEX: 32.85 KG/M2 | WEIGHT: 174 LBS

## 2022-11-21 VITALS — SYSTOLIC BLOOD PRESSURE: 121 MMHG | DIASTOLIC BLOOD PRESSURE: 53 MMHG

## 2022-11-21 VITALS — DIASTOLIC BLOOD PRESSURE: 51 MMHG | SYSTOLIC BLOOD PRESSURE: 133 MMHG

## 2022-11-21 VITALS — DIASTOLIC BLOOD PRESSURE: 53 MMHG | SYSTOLIC BLOOD PRESSURE: 122 MMHG

## 2022-11-21 VITALS — SYSTOLIC BLOOD PRESSURE: 106 MMHG | DIASTOLIC BLOOD PRESSURE: 43 MMHG

## 2022-11-21 DIAGNOSIS — I25.10: ICD-10-CM

## 2022-11-21 DIAGNOSIS — I65.22: Primary | ICD-10-CM

## 2022-11-21 LAB
BUN SERPL-MCNC: 14 MG/DL (ref 7–18)
CHLORIDE SERPL-SCNC: 107 MMOL/L (ref 98–107)
CO2 SERPL-SCNC: 28 MMOL/L (ref 21–32)
CREAT SERPL-MCNC: 0.8 MG/DL (ref 0.6–1.3)
GLUCOSE SERPL-MCNC: 107 MG/DL (ref 74–106)
HCT VFR BLD AUTO: 37.1 % (ref 31.2–41.9)
MAGNESIUM SERPL-MCNC: 1.9 MG/DL (ref 1.8–2.4)
MCH RBC QN AUTO: 30.8 UUG (ref 24.7–32.8)
MCV RBC AUTO: 89 FL (ref 75.5–95.3)
PHOSPHATE SERPL-MCNC: 3.6 MG/DL (ref 2.5–4.9)
PLATELET # BLD AUTO: 183 K/UL (ref 179–408)
POTASSIUM SERPL-SCNC: 3.8 MMOL/L (ref 3.5–5.1)

## 2022-11-21 PROCEDURE — 75574 CT ANGIO HRT W/3D IMAGE: CPT

## 2022-11-21 RX ADMIN — Medication PRN MG: at 11:17

## 2022-11-21 RX ADMIN — Medication PRN MG: at 11:22

## 2022-11-21 RX ADMIN — PANTOPRAZOLE SODIUM SCH MG: 40 TABLET, DELAYED RELEASE ORAL at 06:33

## 2022-11-21 RX ADMIN — Medication PRN MG: at 11:07

## 2022-11-21 RX ADMIN — Medication PRN MG: at 11:02

## 2022-11-21 RX ADMIN — Medication PRN MG: at 11:27

## 2022-11-21 RX ADMIN — Medication PRN MG: at 11:12

## 2022-11-21 RX ADMIN — ASPIRIN SCH MG: 81 TABLET, CHEWABLE ORAL at 08:12

## 2022-11-21 NOTE — NUR
dc orders received noted and carried out dc heplock per md orders.dc instruction and 
education given to the pt and her daughter.pt left the facility via private car in stable 
condition

## 2022-11-21 NOTE — NUR
Patient slept intermittently, no acute distress noted, no SOB. On RA saturating 96-99%. 
Sinus rhythm on tele. No complain of chest pain.

## 2023-08-10 ENCOUNTER — HOSPITAL ENCOUNTER (INPATIENT)
Dept: HOSPITAL 54 - ER | Age: 73
LOS: 5 days | Discharge: HOME HEALTH | DRG: 444 | End: 2023-08-15
Attending: INTERNAL MEDICINE | Admitting: INTERNAL MEDICINE
Payer: MEDICARE

## 2023-08-10 VITALS — OXYGEN SATURATION: 91 % | DIASTOLIC BLOOD PRESSURE: 60 MMHG | TEMPERATURE: 102.6 F | SYSTOLIC BLOOD PRESSURE: 154 MMHG

## 2023-08-10 VITALS — HEIGHT: 67 IN | WEIGHT: 165 LBS | BODY MASS INDEX: 25.9 KG/M2

## 2023-08-10 VITALS — DIASTOLIC BLOOD PRESSURE: 47 MMHG | SYSTOLIC BLOOD PRESSURE: 101 MMHG | OXYGEN SATURATION: 92 % | TEMPERATURE: 98.3 F

## 2023-08-10 VITALS — SYSTOLIC BLOOD PRESSURE: 101 MMHG | OXYGEN SATURATION: 92 % | DIASTOLIC BLOOD PRESSURE: 47 MMHG | TEMPERATURE: 98.3 F

## 2023-08-10 VITALS — TEMPERATURE: 98.1 F | OXYGEN SATURATION: 98 % | SYSTOLIC BLOOD PRESSURE: 123 MMHG | DIASTOLIC BLOOD PRESSURE: 73 MMHG

## 2023-08-10 DIAGNOSIS — Z79.899: ICD-10-CM

## 2023-08-10 DIAGNOSIS — D69.6: ICD-10-CM

## 2023-08-10 DIAGNOSIS — E88.09: ICD-10-CM

## 2023-08-10 DIAGNOSIS — E80.6: ICD-10-CM

## 2023-08-10 DIAGNOSIS — Z88.5: ICD-10-CM

## 2023-08-10 DIAGNOSIS — K80.51: Primary | ICD-10-CM

## 2023-08-10 DIAGNOSIS — K57.30: ICD-10-CM

## 2023-08-10 DIAGNOSIS — K21.9: ICD-10-CM

## 2023-08-10 DIAGNOSIS — K83.8: ICD-10-CM

## 2023-08-10 DIAGNOSIS — I10: ICD-10-CM

## 2023-08-10 DIAGNOSIS — D72.829: ICD-10-CM

## 2023-08-10 DIAGNOSIS — E43: ICD-10-CM

## 2023-08-10 DIAGNOSIS — R74.01: ICD-10-CM

## 2023-08-10 DIAGNOSIS — Z90.49: ICD-10-CM

## 2023-08-10 DIAGNOSIS — F32.9: ICD-10-CM

## 2023-08-10 DIAGNOSIS — K44.9: ICD-10-CM

## 2023-08-10 LAB
ALBUMIN SERPL BCP-MCNC: 3.1 G/DL (ref 3.4–5)
ALP SERPL-CCNC: 178 U/L (ref 46–116)
ALT SERPL W P-5'-P-CCNC: 305 U/L (ref 12–78)
APPEARANCE UR: CLEAR
APTT PPP: 20.2 SEC (ref 24.3–34.3)
AST SERPL W P-5'-P-CCNC: 571 U/L (ref 15–37)
BACTERIA UR CULT: YES
BASOPHILS # BLD AUTO: 0 K/UL (ref 0–0.2)
BASOPHILS NFR BLD AUTO: 0.2 % (ref 0–2)
BILIRUB DIRECT SERPL-MCNC: 1.5 MG/DL (ref 0–0.2)
BILIRUB SERPL-MCNC: 2.1 MG/DL (ref 0.2–1)
BILIRUB UR QL STRIP: (no result)
BUN SERPL-MCNC: 13 MG/DL (ref 7–18)
CALCIUM SERPL-MCNC: 9.1 MG/DL (ref 8.5–10.1)
CHLORIDE SERPL-SCNC: 104 MMOL/L (ref 98–107)
CO2 SERPL-SCNC: 23 MMOL/L (ref 21–32)
COLOR UR: YELLOW
CREAT SERPL-MCNC: 0.9 MG/DL (ref 0.6–1.3)
DEPRECATED SQUAMOUS URNS QL MICRO: (no result) /HPF
EOSINOPHIL # BLD AUTO: 0 K/UL (ref 0–0.7)
EOSINOPHIL NFR BLD AUTO: 0 % (ref 0–6)
ERYTHROCYTE [DISTWIDTH] IN BLOOD BY AUTOMATED COUNT: 14.1 % (ref 11.5–15)
GLUCOSE SERPL-MCNC: 184 MG/DL (ref 74–106)
GLUCOSE UR STRIP-MCNC: NEGATIVE MG/DL
HCT VFR BLD AUTO: 38 % (ref 33–45)
HGB BLD-MCNC: 12.6 G/DL (ref 11.5–14.8)
HGB UR QL STRIP: (no result) ERY/UL
INR PPP: 1.01 (ref 0.91–1.1)
KETONES UR STRIP-MCNC: (no result) MG/DL
LEUKOCYTE ESTERASE UR QL STRIP: (no result)
LIPASE SERPL-CCNC: 52 U/L (ref 73–393)
LYMPHOCYTES NFR BLD AUTO: 0.6 K/UL (ref 0.8–4.8)
LYMPHOCYTES NFR BLD AUTO: 4.1 % (ref 20–44)
MCH RBC QN AUTO: 30 PG (ref 26–33)
MCHC RBC AUTO-ENTMCNC: 34 G/DL (ref 31–36)
MCV RBC AUTO: 89 FL (ref 82–100)
MONOCYTES NFR BLD AUTO: 0.8 K/UL (ref 0.1–1.3)
MONOCYTES NFR BLD AUTO: 5.5 % (ref 2–12)
NEUTROPHILS # BLD AUTO: 12.6 K/UL (ref 1.8–8.9)
NEUTROPHILS NFR BLD AUTO: 90.2 % (ref 43–81)
NITRITE UR QL STRIP: NEGATIVE
PH UR STRIP: 6 [PH] (ref 5–8)
PLATELET # BLD AUTO: 152 K/UL (ref 150–450)
POTASSIUM SERPL-SCNC: 3.7 MMOL/L (ref 3.5–5.1)
PROT SERPL-MCNC: 7.2 G/DL (ref 6.4–8.2)
PROT UR QL STRIP: NEGATIVE MG/DL
PROTHROMBIN TIME: 10.6 SECS (ref 9.2–11.1)
RBC # BLD AUTO: 4.21 MIL/UL (ref 4–5.2)
RBC #/AREA URNS HPF: (no result) /HPF (ref 0–2)
SODIUM SERPL-SCNC: 139 MMOL/L (ref 136–145)
SP GR UR STRIP: 1.02 (ref 1–1.03)
UROBILINOGEN UR STRIP-MCNC: 2 EU/DL
WBC #/AREA URNS HPF: (no result) /HPF
WBC NRBC COR # BLD AUTO: 14 K/UL (ref 4.3–11)

## 2023-08-10 PROCEDURE — G0378 HOSPITAL OBSERVATION PER HR: HCPCS

## 2023-08-10 PROCEDURE — C1726 CATH, BAL DIL, NON-VASCULAR: HCPCS

## 2023-08-10 PROCEDURE — A4223 INFUSION SUPPLIES W/O PUMP: HCPCS

## 2023-08-10 PROCEDURE — C2625 STENT, NON-COR, TEM W/DEL SY: HCPCS

## 2023-08-10 RX ADMIN — Medication SCH EACH: at 23:09

## 2023-08-10 RX ADMIN — ACETAMINOPHEN PRN MG: 325 TABLET ORAL at 16:10

## 2023-08-10 RX ADMIN — Medication SCH EACH: at 18:06

## 2023-08-10 RX ADMIN — HYDROMORPHONE HYDROCHLORIDE PRN MG: 2 INJECTION, SOLUTION INTRAMUSCULAR; INTRAVENOUS; SUBCUTANEOUS at 20:23

## 2023-08-10 RX ADMIN — HYDROMORPHONE HYDROCHLORIDE PRN MG: 2 INJECTION, SOLUTION INTRAMUSCULAR; INTRAVENOUS; SUBCUTANEOUS at 14:24

## 2023-08-10 RX ADMIN — SODIUM CHLORIDE PRN MLS/HR: 9 INJECTION, SOLUTION INTRAVENOUS at 14:49

## 2023-08-10 RX ADMIN — PIPERACILLIN SODIUM AND TAZOBACTAM SODIUM SCH MLS/HR: .375; 3 INJECTION, POWDER, LYOPHILIZED, FOR SOLUTION INTRAVENOUS at 20:29

## 2023-08-11 VITALS — TEMPERATURE: 98 F | SYSTOLIC BLOOD PRESSURE: 141 MMHG | DIASTOLIC BLOOD PRESSURE: 82 MMHG | OXYGEN SATURATION: 95 %

## 2023-08-11 VITALS — OXYGEN SATURATION: 93 % | TEMPERATURE: 98.7 F | DIASTOLIC BLOOD PRESSURE: 55 MMHG | SYSTOLIC BLOOD PRESSURE: 105 MMHG

## 2023-08-11 VITALS — OXYGEN SATURATION: 89 % | DIASTOLIC BLOOD PRESSURE: 59 MMHG | SYSTOLIC BLOOD PRESSURE: 95 MMHG | TEMPERATURE: 100.1 F

## 2023-08-11 VITALS — DIASTOLIC BLOOD PRESSURE: 82 MMHG | SYSTOLIC BLOOD PRESSURE: 141 MMHG

## 2023-08-11 LAB
ALBUMIN SERPL BCP-MCNC: 2.5 G/DL (ref 3.4–5)
ALP SERPL-CCNC: 167 U/L (ref 46–116)
ALT SERPL W P-5'-P-CCNC: 205 U/L (ref 12–78)
AST SERPL W P-5'-P-CCNC: 178 U/L (ref 15–37)
BASOPHILS # BLD AUTO: 0 K/UL (ref 0–0.2)
BASOPHILS NFR BLD AUTO: 0.1 % (ref 0–2)
BILIRUB DIRECT SERPL-MCNC: 4.8 MG/DL (ref 0–0.2)
BILIRUB SERPL-MCNC: 5.6 MG/DL (ref 0.2–1)
BUN SERPL-MCNC: 17 MG/DL (ref 7–18)
CALCIUM SERPL-MCNC: 8.7 MG/DL (ref 8.5–10.1)
CHLORIDE SERPL-SCNC: 106 MMOL/L (ref 98–107)
CO2 SERPL-SCNC: 24 MMOL/L (ref 21–32)
CREAT SERPL-MCNC: 1.1 MG/DL (ref 0.6–1.3)
EOSINOPHIL # BLD AUTO: 0 K/UL (ref 0–0.7)
EOSINOPHIL NFR BLD AUTO: 0 % (ref 0–6)
ERYTHROCYTE [DISTWIDTH] IN BLOOD BY AUTOMATED COUNT: 14.1 % (ref 11.5–15)
GLUCOSE SERPL-MCNC: 129 MG/DL (ref 74–106)
HCT VFR BLD AUTO: 34 % (ref 33–45)
HGB BLD-MCNC: 11.4 G/DL (ref 11.5–14.8)
LYMPHOCYTES NFR BLD AUTO: 0.8 K/UL (ref 0.8–4.8)
LYMPHOCYTES NFR BLD AUTO: 6.2 % (ref 20–44)
MAGNESIUM SERPL-MCNC: 1.9 MG/DL (ref 1.8–2.4)
MCH RBC QN AUTO: 30 PG (ref 26–33)
MCHC RBC AUTO-ENTMCNC: 33 G/DL (ref 31–36)
MCV RBC AUTO: 90 FL (ref 82–100)
MONOCYTES NFR BLD AUTO: 0.8 K/UL (ref 0.1–1.3)
MONOCYTES NFR BLD AUTO: 6.1 % (ref 2–12)
NEUTROPHILS # BLD AUTO: 11.1 K/UL (ref 1.8–8.9)
NEUTROPHILS NFR BLD AUTO: 87.6 % (ref 43–81)
PHOSPHATE SERPL-MCNC: 3.6 MG/DL (ref 2.5–4.9)
PLATELET # BLD AUTO: 126 K/UL (ref 150–450)
POTASSIUM SERPL-SCNC: 3.2 MMOL/L (ref 3.5–5.1)
PROT SERPL-MCNC: 6.4 G/DL (ref 6.4–8.2)
RBC # BLD AUTO: 3.8 MIL/UL (ref 4–5.2)
SODIUM SERPL-SCNC: 140 MMOL/L (ref 136–145)
WBC NRBC COR # BLD AUTO: 12.7 K/UL (ref 4.3–11)

## 2023-08-11 RX ADMIN — HYDROMORPHONE HYDROCHLORIDE PRN MG: 2 INJECTION, SOLUTION INTRAMUSCULAR; INTRAVENOUS; SUBCUTANEOUS at 19:18

## 2023-08-11 RX ADMIN — Medication SCH EACH: at 11:26

## 2023-08-11 RX ADMIN — Medication SCH EACH: at 06:10

## 2023-08-11 RX ADMIN — SODIUM CHLORIDE PRN MLS/HR: 9 INJECTION, SOLUTION INTRAVENOUS at 06:08

## 2023-08-11 RX ADMIN — POTASSIUM CHLORIDE SCH MLS/HR: 200 INJECTION, SOLUTION INTRAVENOUS at 14:36

## 2023-08-11 RX ADMIN — HYDROMORPHONE HYDROCHLORIDE PRN MG: 2 INJECTION, SOLUTION INTRAMUSCULAR; INTRAVENOUS; SUBCUTANEOUS at 15:14

## 2023-08-11 RX ADMIN — POTASSIUM CHLORIDE SCH MLS/HR: 200 INJECTION, SOLUTION INTRAVENOUS at 11:15

## 2023-08-11 RX ADMIN — Medication SCH EACH: at 23:47

## 2023-08-11 RX ADMIN — POTASSIUM CHLORIDE SCH MLS/HR: 200 INJECTION, SOLUTION INTRAVENOUS at 17:38

## 2023-08-11 RX ADMIN — Medication SCH EACH: at 17:39

## 2023-08-11 RX ADMIN — POTASSIUM CHLORIDE SCH MLS/HR: 200 INJECTION, SOLUTION INTRAVENOUS at 13:30

## 2023-08-11 RX ADMIN — PIPERACILLIN SODIUM AND TAZOBACTAM SODIUM SCH MLS/HR: .375; 3 INJECTION, POWDER, LYOPHILIZED, FOR SOLUTION INTRAVENOUS at 12:37

## 2023-08-11 RX ADMIN — ACETAMINOPHEN PRN MG: 650 SUPPOSITORY RECTAL at 14:38

## 2023-08-11 RX ADMIN — HYDROMORPHONE HYDROCHLORIDE PRN MG: 2 INJECTION, SOLUTION INTRAMUSCULAR; INTRAVENOUS; SUBCUTANEOUS at 09:13

## 2023-08-11 RX ADMIN — SODIUM CHLORIDE PRN MLS/HR: 9 INJECTION, SOLUTION INTRAVENOUS at 20:39

## 2023-08-11 RX ADMIN — PIPERACILLIN SODIUM AND TAZOBACTAM SODIUM SCH MLS/HR: .375; 3 INJECTION, POWDER, LYOPHILIZED, FOR SOLUTION INTRAVENOUS at 04:25

## 2023-08-11 RX ADMIN — PIPERACILLIN SODIUM AND TAZOBACTAM SODIUM SCH MLS/HR: .375; 3 INJECTION, POWDER, LYOPHILIZED, FOR SOLUTION INTRAVENOUS at 19:56

## 2023-08-12 VITALS — TEMPERATURE: 97.8 F | SYSTOLIC BLOOD PRESSURE: 141 MMHG | DIASTOLIC BLOOD PRESSURE: 79 MMHG | OXYGEN SATURATION: 98 %

## 2023-08-12 VITALS — DIASTOLIC BLOOD PRESSURE: 109 MMHG | OXYGEN SATURATION: 97 % | SYSTOLIC BLOOD PRESSURE: 159 MMHG | TEMPERATURE: 98.2 F

## 2023-08-12 VITALS — DIASTOLIC BLOOD PRESSURE: 55 MMHG | TEMPERATURE: 97.7 F | OXYGEN SATURATION: 95 % | SYSTOLIC BLOOD PRESSURE: 112 MMHG

## 2023-08-12 LAB
ALBUMIN SERPL BCP-MCNC: 2.1 G/DL (ref 3.4–5)
ALP SERPL-CCNC: 154 U/L (ref 46–116)
ALT SERPL W P-5'-P-CCNC: 137 U/L (ref 12–78)
AST SERPL W P-5'-P-CCNC: 137 U/L (ref 15–37)
BASOPHILS # BLD AUTO: 0 K/UL (ref 0–0.2)
BASOPHILS NFR BLD AUTO: 0.1 % (ref 0–2)
BILIRUB SERPL-MCNC: 5.9 MG/DL (ref 0.2–1)
BUN SERPL-MCNC: 22 MG/DL (ref 7–18)
CALCIUM SERPL-MCNC: 8.6 MG/DL (ref 8.5–10.1)
CHLORIDE SERPL-SCNC: 109 MMOL/L (ref 98–107)
CO2 SERPL-SCNC: 21 MMOL/L (ref 21–32)
CREAT SERPL-MCNC: 0.8 MG/DL (ref 0.6–1.3)
EOSINOPHIL # BLD AUTO: 0.6 K/UL (ref 0–0.7)
EOSINOPHIL NFR BLD AUTO: 4.9 % (ref 0–6)
ERYTHROCYTE [DISTWIDTH] IN BLOOD BY AUTOMATED COUNT: 14.8 % (ref 11.5–15)
GLUCOSE SERPL-MCNC: 106 MG/DL (ref 74–106)
HCT VFR BLD AUTO: 33 % (ref 33–45)
HGB BLD-MCNC: 10.8 G/DL (ref 11.5–14.8)
LIPASE SERPL-CCNC: 83 U/L (ref 73–393)
LYMPHOCYTES NFR BLD AUTO: 0.7 K/UL (ref 0.8–4.8)
LYMPHOCYTES NFR BLD AUTO: 5.2 % (ref 20–44)
MAGNESIUM SERPL-MCNC: 1.9 MG/DL (ref 1.8–2.4)
MCH RBC QN AUTO: 30 PG (ref 26–33)
MCHC RBC AUTO-ENTMCNC: 33 G/DL (ref 31–36)
MCV RBC AUTO: 92 FL (ref 82–100)
MONOCYTES NFR BLD AUTO: 0.8 K/UL (ref 0.1–1.3)
MONOCYTES NFR BLD AUTO: 6.2 % (ref 2–12)
NEUTROPHILS # BLD AUTO: 10.8 K/UL (ref 1.8–8.9)
NEUTROPHILS NFR BLD AUTO: 83.6 % (ref 43–81)
PHOSPHATE SERPL-MCNC: 2.7 MG/DL (ref 2.5–4.9)
PLATELET # BLD AUTO: 95 K/UL (ref 150–450)
POTASSIUM SERPL-SCNC: 4.2 MMOL/L (ref 3.5–5.1)
PROT SERPL-MCNC: 6.2 G/DL (ref 6.4–8.2)
RBC # BLD AUTO: 3.61 MIL/UL (ref 4–5.2)
SODIUM SERPL-SCNC: 140 MMOL/L (ref 136–145)
WBC NRBC COR # BLD AUTO: 12.9 K/UL (ref 4.3–11)

## 2023-08-12 RX ADMIN — Medication SCH EACH: at 17:50

## 2023-08-12 RX ADMIN — HYDROMORPHONE HYDROCHLORIDE PRN MG: 2 INJECTION, SOLUTION INTRAMUSCULAR; INTRAVENOUS; SUBCUTANEOUS at 12:33

## 2023-08-12 RX ADMIN — HYDROMORPHONE HYDROCHLORIDE PRN MG: 2 INJECTION, SOLUTION INTRAMUSCULAR; INTRAVENOUS; SUBCUTANEOUS at 01:11

## 2023-08-12 RX ADMIN — PIPERACILLIN SODIUM AND TAZOBACTAM SODIUM SCH MLS/HR: .375; 3 INJECTION, POWDER, LYOPHILIZED, FOR SOLUTION INTRAVENOUS at 03:00

## 2023-08-12 RX ADMIN — ACETAMINOPHEN PRN MG: 325 TABLET ORAL at 09:51

## 2023-08-12 RX ADMIN — HYDROMORPHONE HYDROCHLORIDE PRN MG: 2 INJECTION, SOLUTION INTRAMUSCULAR; INTRAVENOUS; SUBCUTANEOUS at 17:05

## 2023-08-12 RX ADMIN — PIPERACILLIN SODIUM AND TAZOBACTAM SODIUM SCH MLS/HR: .375; 3 INJECTION, POWDER, LYOPHILIZED, FOR SOLUTION INTRAVENOUS at 11:47

## 2023-08-12 RX ADMIN — SODIUM CHLORIDE PRN MLS/HR: 9 INJECTION, SOLUTION INTRAVENOUS at 23:59

## 2023-08-12 RX ADMIN — PIPERACILLIN SODIUM AND TAZOBACTAM SODIUM SCH MLS/HR: .375; 3 INJECTION, POWDER, LYOPHILIZED, FOR SOLUTION INTRAVENOUS at 20:04

## 2023-08-12 RX ADMIN — Medication SCH EACH: at 06:04

## 2023-08-12 RX ADMIN — Medication SCH EACH: at 11:47

## 2023-08-12 RX ADMIN — HYDROMORPHONE HYDROCHLORIDE PRN MG: 2 INJECTION, SOLUTION INTRAMUSCULAR; INTRAVENOUS; SUBCUTANEOUS at 20:07

## 2023-08-13 VITALS — TEMPERATURE: 97.5 F | OXYGEN SATURATION: 95 % | DIASTOLIC BLOOD PRESSURE: 83 MMHG | SYSTOLIC BLOOD PRESSURE: 124 MMHG

## 2023-08-13 VITALS — DIASTOLIC BLOOD PRESSURE: 56 MMHG | SYSTOLIC BLOOD PRESSURE: 103 MMHG | TEMPERATURE: 99.3 F | OXYGEN SATURATION: 93 %

## 2023-08-13 VITALS — OXYGEN SATURATION: 94 % | TEMPERATURE: 97.8 F | SYSTOLIC BLOOD PRESSURE: 113 MMHG | DIASTOLIC BLOOD PRESSURE: 81 MMHG

## 2023-08-13 VITALS — DIASTOLIC BLOOD PRESSURE: 58 MMHG | TEMPERATURE: 97.6 F | OXYGEN SATURATION: 96 % | SYSTOLIC BLOOD PRESSURE: 125 MMHG

## 2023-08-13 LAB
ALBUMIN SERPL BCP-MCNC: 1.9 G/DL (ref 3.4–5)
ALP SERPL-CCNC: 175 U/L (ref 46–116)
ALT SERPL W P-5'-P-CCNC: 96 U/L (ref 12–78)
ANISOCYTOSIS BLD QL: (no result)
AST SERPL W P-5'-P-CCNC: 75 U/L (ref 15–37)
BASOPHILS # BLD AUTO: 0 K/UL (ref 0–0.2)
BASOPHILS NFR BLD AUTO: 0.1 % (ref 0–2)
BASOPHILS NFR BLD MANUAL: 0 % (ref 0–2)
BILIRUB SERPL-MCNC: 6.7 MG/DL (ref 0.2–1)
BUN SERPL-MCNC: 23 MG/DL (ref 7–18)
CALCIUM SERPL-MCNC: 9.2 MG/DL (ref 8.5–10.1)
CHLORIDE SERPL-SCNC: 110 MMOL/L (ref 98–107)
CO2 SERPL-SCNC: 21 MMOL/L (ref 21–32)
CREAT SERPL-MCNC: 0.8 MG/DL (ref 0.6–1.3)
EOSINOPHIL # BLD AUTO: 0.1 K/UL (ref 0–0.7)
EOSINOPHIL NFR BLD AUTO: 0.9 % (ref 0–6)
EOSINOPHIL NFR BLD MANUAL: 0 % (ref 0–4)
ERYTHROCYTE [DISTWIDTH] IN BLOOD BY AUTOMATED COUNT: 14.5 % (ref 11.5–15)
GLUCOSE SERPL-MCNC: 122 MG/DL (ref 74–106)
HCT VFR BLD AUTO: 33 % (ref 33–45)
HGB BLD-MCNC: 10.9 G/DL (ref 11.5–14.8)
LIPASE SERPL-CCNC: 20 U/L (ref 73–393)
LYMPHOCYTES NFR BLD AUTO: 0.7 K/UL (ref 0.8–4.8)
LYMPHOCYTES NFR BLD AUTO: 4.7 % (ref 20–44)
LYMPHOCYTES NFR BLD MANUAL: 6 % (ref 16–48)
MAGNESIUM SERPL-MCNC: 2.1 MG/DL (ref 1.8–2.4)
MCH RBC QN AUTO: 30 PG (ref 26–33)
MCHC RBC AUTO-ENTMCNC: 33 G/DL (ref 31–36)
MCV RBC AUTO: 89 FL (ref 82–100)
MONOCYTES NFR BLD AUTO: 0.9 K/UL (ref 0.1–1.3)
MONOCYTES NFR BLD AUTO: 5.6 % (ref 2–12)
MONOCYTES NFR BLD MANUAL: 5 % (ref 0–11)
NEUTROPHILS # BLD AUTO: 13.4 K/UL (ref 1.8–8.9)
NEUTROPHILS NFR BLD AUTO: 88.7 % (ref 43–81)
NEUTS SEG NFR BLD MANUAL: 89 % (ref 42–76)
PLATELET # BLD AUTO: 61 K/UL (ref 150–450)
PLATELET BLD QL SMEAR: (no result)
POTASSIUM SERPL-SCNC: 2.9 MMOL/L (ref 3.5–5.1)
PROT SERPL-MCNC: 5.9 G/DL (ref 6.4–8.2)
RBC # BLD AUTO: 3.66 MIL/UL (ref 4–5.2)
SODIUM SERPL-SCNC: 143 MMOL/L (ref 136–145)
WBC NRBC COR # BLD AUTO: 15.2 K/UL (ref 4.3–11)

## 2023-08-13 RX ADMIN — MEROPENEM SCH MLS/HR: 1 INJECTION INTRAVENOUS at 13:29

## 2023-08-13 RX ADMIN — Medication SCH EACH: at 17:02

## 2023-08-13 RX ADMIN — Medication SCH EACH: at 11:49

## 2023-08-13 RX ADMIN — POTASSIUM CHLORIDE SCH MLS/HR: 200 INJECTION, SOLUTION INTRAVENOUS at 12:08

## 2023-08-13 RX ADMIN — HYDROMORPHONE HYDROCHLORIDE PRN MG: 2 INJECTION, SOLUTION INTRAMUSCULAR; INTRAVENOUS; SUBCUTANEOUS at 06:48

## 2023-08-13 RX ADMIN — POTASSIUM CHLORIDE SCH MLS/HR: 200 INJECTION, SOLUTION INTRAVENOUS at 16:50

## 2023-08-13 RX ADMIN — Medication SCH EACH: at 05:54

## 2023-08-13 RX ADMIN — MEROPENEM SCH MLS/HR: 1 INJECTION INTRAVENOUS at 21:17

## 2023-08-13 RX ADMIN — POTASSIUM CHLORIDE SCH MLS/HR: 200 INJECTION, SOLUTION INTRAVENOUS at 10:22

## 2023-08-13 RX ADMIN — Medication SCH EACH: at 00:11

## 2023-08-13 RX ADMIN — ACETAMINOPHEN PRN MG: 650 SUPPOSITORY RECTAL at 01:02

## 2023-08-13 RX ADMIN — POTASSIUM CHLORIDE SCH MLS/HR: 200 INJECTION, SOLUTION INTRAVENOUS at 15:02

## 2023-08-13 RX ADMIN — POTASSIUM CHLORIDE SCH MLS/HR: 200 INJECTION, SOLUTION INTRAVENOUS at 18:06

## 2023-08-13 RX ADMIN — POTASSIUM CHLORIDE SCH MLS/HR: 200 INJECTION, SOLUTION INTRAVENOUS at 09:00

## 2023-08-13 RX ADMIN — PIPERACILLIN SODIUM AND TAZOBACTAM SODIUM SCH MLS/HR: .375; 3 INJECTION, POWDER, LYOPHILIZED, FOR SOLUTION INTRAVENOUS at 04:06

## 2023-08-13 RX ADMIN — ACETAMINOPHEN PRN MG: 325 TABLET ORAL at 01:00

## 2023-08-13 RX ADMIN — ACETAMINOPHEN PRN MG: 325 TABLET ORAL at 00:52

## 2023-08-14 VITALS — SYSTOLIC BLOOD PRESSURE: 136 MMHG | OXYGEN SATURATION: 96 % | TEMPERATURE: 98 F | DIASTOLIC BLOOD PRESSURE: 81 MMHG

## 2023-08-14 VITALS — DIASTOLIC BLOOD PRESSURE: 70 MMHG | TEMPERATURE: 98.5 F | SYSTOLIC BLOOD PRESSURE: 134 MMHG | OXYGEN SATURATION: 97 %

## 2023-08-14 VITALS — SYSTOLIC BLOOD PRESSURE: 140 MMHG | TEMPERATURE: 97.9 F | OXYGEN SATURATION: 96 % | DIASTOLIC BLOOD PRESSURE: 80 MMHG

## 2023-08-14 VITALS — TEMPERATURE: 97 F | SYSTOLIC BLOOD PRESSURE: 136 MMHG | OXYGEN SATURATION: 96 % | DIASTOLIC BLOOD PRESSURE: 81 MMHG

## 2023-08-14 VITALS — DIASTOLIC BLOOD PRESSURE: 76 MMHG | SYSTOLIC BLOOD PRESSURE: 139 MMHG | TEMPERATURE: 98 F | OXYGEN SATURATION: 95 %

## 2023-08-14 LAB
ALBUMIN SERPL BCP-MCNC: 1.9 G/DL (ref 3.4–5)
ALP SERPL-CCNC: 165 U/L (ref 46–116)
ALT SERPL W P-5'-P-CCNC: 79 U/L (ref 12–78)
ANISOCYTOSIS BLD QL: (no result)
ANISOCYTOSIS BLD QL: (no result)
AST SERPL W P-5'-P-CCNC: 67 U/L (ref 15–37)
BASOPHILS # BLD AUTO: 0 K/UL (ref 0–0.2)
BASOPHILS # BLD AUTO: 0 K/UL (ref 0–0.2)
BASOPHILS NFR BLD AUTO: 0.2 % (ref 0–2)
BASOPHILS NFR BLD AUTO: 0.4 % (ref 0–2)
BASOPHILS NFR BLD MANUAL: 0 % (ref 0–2)
BILIRUB SERPL-MCNC: 3.4 MG/DL (ref 0.2–1)
BUN SERPL-MCNC: 23 MG/DL (ref 7–18)
CALCIUM SERPL-MCNC: 9.2 MG/DL (ref 8.5–10.1)
CHLORIDE SERPL-SCNC: 110 MMOL/L (ref 98–107)
CO2 SERPL-SCNC: 21 MMOL/L (ref 21–32)
CREAT SERPL-MCNC: 0.4 MG/DL (ref 0.6–1.3)
EOSINOPHIL # BLD AUTO: 0 K/UL (ref 0–0.7)
EOSINOPHIL # BLD AUTO: 0.1 K/UL (ref 0–0.7)
EOSINOPHIL NFR BLD AUTO: 0.3 % (ref 0–6)
EOSINOPHIL NFR BLD AUTO: 1.2 % (ref 0–6)
EOSINOPHIL NFR BLD MANUAL: 1 % (ref 0–4)
EOSINOPHIL NFR BLD MANUAL: 2 % (ref 0–4)
ERYTHROCYTE [DISTWIDTH] IN BLOOD BY AUTOMATED COUNT: 14.7 % (ref 11.5–15)
ERYTHROCYTE [DISTWIDTH] IN BLOOD BY AUTOMATED COUNT: 15.1 % (ref 11.5–15)
GLUCOSE SERPL-MCNC: 76 MG/DL (ref 74–106)
HCT VFR BLD AUTO: 34 % (ref 33–45)
HCT VFR BLD AUTO: 37 % (ref 33–45)
HGB BLD-MCNC: 11.5 G/DL (ref 11.5–14.8)
HGB BLD-MCNC: 12 G/DL (ref 11.5–14.8)
LIPASE SERPL-CCNC: 68 U/L (ref 73–393)
LYMPHOCYTES NFR BLD AUTO: 1 K/UL (ref 0.8–4.8)
LYMPHOCYTES NFR BLD AUTO: 1.1 K/UL (ref 0.8–4.8)
LYMPHOCYTES NFR BLD AUTO: 10.1 % (ref 20–44)
LYMPHOCYTES NFR BLD AUTO: 10.6 % (ref 20–44)
LYMPHOCYTES NFR BLD MANUAL: 11 % (ref 16–48)
LYMPHOCYTES NFR BLD MANUAL: 12 % (ref 16–48)
MAGNESIUM SERPL-MCNC: 2.1 MG/DL (ref 1.8–2.4)
MCH RBC QN AUTO: 30 PG (ref 26–33)
MCH RBC QN AUTO: 30 PG (ref 26–33)
MCHC RBC AUTO-ENTMCNC: 33 G/DL (ref 31–36)
MCHC RBC AUTO-ENTMCNC: 34 G/DL (ref 31–36)
MCV RBC AUTO: 89 FL (ref 82–100)
MCV RBC AUTO: 90 FL (ref 82–100)
MONOCYTES NFR BLD AUTO: 0.4 K/UL (ref 0.1–1.3)
MONOCYTES NFR BLD AUTO: 0.6 K/UL (ref 0.1–1.3)
MONOCYTES NFR BLD AUTO: 3.8 % (ref 2–12)
MONOCYTES NFR BLD AUTO: 5.6 % (ref 2–12)
MONOCYTES NFR BLD MANUAL: 1 % (ref 0–11)
MONOCYTES NFR BLD MANUAL: 6 % (ref 0–11)
NEUTROPHILS # BLD AUTO: 8.2 K/UL (ref 1.8–8.9)
NEUTROPHILS # BLD AUTO: 8.6 K/UL (ref 1.8–8.9)
NEUTROPHILS NFR BLD AUTO: 82.2 % (ref 43–81)
NEUTROPHILS NFR BLD AUTO: 85.6 % (ref 43–81)
NEUTS BAND NFR BLD MANUAL: 4 % (ref 0–5)
NEUTS SEG NFR BLD MANUAL: 80 % (ref 42–76)
NEUTS SEG NFR BLD MANUAL: 83 % (ref 42–76)
PHOSPHATE SERPL-MCNC: 1 MG/DL (ref 2.5–4.9)
PLATELET # BLD AUTO: 77 K/UL (ref 150–450)
PLATELET # BLD AUTO: 80 K/UL (ref 150–450)
PLATELET BLD QL SMEAR: (no result)
PLATELET BLD QL SMEAR: (no result)
POTASSIUM SERPL-SCNC: 4 MMOL/L (ref 3.5–5.1)
PROT SERPL-MCNC: 6.4 G/DL (ref 6.4–8.2)
RBC # BLD AUTO: 3.85 MIL/UL (ref 4–5.2)
RBC # BLD AUTO: 4.08 MIL/UL (ref 4–5.2)
SODIUM SERPL-SCNC: 143 MMOL/L (ref 136–145)
WBC NRBC COR # BLD AUTO: 10.5 K/UL (ref 4.3–11)
WBC NRBC COR # BLD AUTO: 9.6 K/UL (ref 4.3–11)

## 2023-08-14 PROCEDURE — 0FPB8DZ REMOVAL OF INTRALUMINAL DEVICE FROM HEPATOBILIARY DUCT, VIA NATURAL OR ARTIFICIAL OPENING ENDOSCOPIC: ICD-10-PCS | Performed by: INTERNAL MEDICINE

## 2023-08-14 PROCEDURE — 0F798DZ DILATION OF COMMON BILE DUCT WITH INTRALUMINAL DEVICE, VIA NATURAL OR ARTIFICIAL OPENING ENDOSCOPIC: ICD-10-PCS | Performed by: INTERNAL MEDICINE

## 2023-08-14 PROCEDURE — 0FC98ZZ EXTIRPATION OF MATTER FROM COMMON BILE DUCT, VIA NATURAL OR ARTIFICIAL OPENING ENDOSCOPIC: ICD-10-PCS | Performed by: INTERNAL MEDICINE

## 2023-08-14 PROCEDURE — BF10YZZ FLUOROSCOPY OF BILE DUCTS USING OTHER CONTRAST: ICD-10-PCS | Performed by: INTERNAL MEDICINE

## 2023-08-14 RX ADMIN — Medication SCH EACH: at 17:21

## 2023-08-14 RX ADMIN — MEROPENEM SCH MLS/HR: 1 INJECTION INTRAVENOUS at 05:11

## 2023-08-14 RX ADMIN — POTASSIUM CHLORIDE SCH MLS/HR: 200 INJECTION, SOLUTION INTRAVENOUS at 02:54

## 2023-08-14 RX ADMIN — HYDROMORPHONE HYDROCHLORIDE PRN MG: 2 INJECTION, SOLUTION INTRAMUSCULAR; INTRAVENOUS; SUBCUTANEOUS at 18:25

## 2023-08-14 RX ADMIN — HYDROMORPHONE HYDROCHLORIDE PRN MG: 2 INJECTION, SOLUTION INTRAMUSCULAR; INTRAVENOUS; SUBCUTANEOUS at 23:50

## 2023-08-14 RX ADMIN — Medication SCH EACH: at 14:34

## 2023-08-14 RX ADMIN — Medication SCH EACH: at 23:56

## 2023-08-14 RX ADMIN — MEROPENEM SCH MLS/HR: 1 INJECTION INTRAVENOUS at 14:41

## 2023-08-14 RX ADMIN — Medication SCH EACH: at 06:16

## 2023-08-14 RX ADMIN — POTASSIUM CHLORIDE SCH MLS/HR: 200 INJECTION, SOLUTION INTRAVENOUS at 04:31

## 2023-08-14 RX ADMIN — HYDROMORPHONE HYDROCHLORIDE PRN MG: 2 INJECTION, SOLUTION INTRAMUSCULAR; INTRAVENOUS; SUBCUTANEOUS at 14:35

## 2023-08-14 RX ADMIN — SODIUM CHLORIDE SCH MLS/HR: 9 INJECTION, SOLUTION INTRAVENOUS at 16:48

## 2023-08-14 RX ADMIN — MEROPENEM SCH MLS/HR: 1 INJECTION INTRAVENOUS at 21:16

## 2023-08-14 RX ADMIN — SODIUM CHLORIDE SCH MLS/HR: 9 INJECTION, SOLUTION INTRAVENOUS at 14:10

## 2023-08-14 RX ADMIN — Medication SCH EACH: at 00:53

## 2023-08-15 VITALS — DIASTOLIC BLOOD PRESSURE: 76 MMHG | SYSTOLIC BLOOD PRESSURE: 139 MMHG | TEMPERATURE: 98 F | OXYGEN SATURATION: 95 %

## 2023-08-15 VITALS — DIASTOLIC BLOOD PRESSURE: 64 MMHG | OXYGEN SATURATION: 98 % | SYSTOLIC BLOOD PRESSURE: 131 MMHG | TEMPERATURE: 98.4 F

## 2023-08-15 LAB
ALBUMIN SERPL BCP-MCNC: 1.9 G/DL (ref 3.4–5)
ALP SERPL-CCNC: 167 U/L (ref 46–116)
ALT SERPL W P-5'-P-CCNC: 61 U/L (ref 12–78)
AST SERPL W P-5'-P-CCNC: 41 U/L (ref 15–37)
BILIRUB DIRECT SERPL-MCNC: 1.2 MG/DL (ref 0–0.2)
BILIRUB SERPL-MCNC: 1.9 MG/DL (ref 0.2–1)
BUN SERPL-MCNC: 25 MG/DL (ref 7–18)
CALCIUM SERPL-MCNC: 8.9 MG/DL (ref 8.5–10.1)
CHLORIDE SERPL-SCNC: 110 MMOL/L (ref 98–107)
CO2 SERPL-SCNC: 25 MMOL/L (ref 21–32)
CREAT SERPL-MCNC: 0.5 MG/DL (ref 0.6–1.3)
GLUCOSE SERPL-MCNC: 147 MG/DL (ref 74–106)
MAGNESIUM SERPL-MCNC: 2.1 MG/DL (ref 1.8–2.4)
PHOSPHATE SERPL-MCNC: 2.1 MG/DL (ref 2.5–4.9)
POTASSIUM SERPL-SCNC: 4.2 MMOL/L (ref 3.5–5.1)
PROT SERPL-MCNC: 6.5 G/DL (ref 6.4–8.2)
SODIUM SERPL-SCNC: 141 MMOL/L (ref 136–145)

## 2023-08-15 RX ADMIN — Medication SCH EACH: at 05:20

## 2023-08-15 RX ADMIN — Medication SCH EACH: at 11:45

## 2023-08-15 RX ADMIN — MEROPENEM SCH MLS/HR: 1 INJECTION INTRAVENOUS at 13:44

## 2023-08-15 RX ADMIN — MEROPENEM SCH MLS/HR: 1 INJECTION INTRAVENOUS at 05:10

## 2023-08-15 RX ADMIN — HYDROMORPHONE HYDROCHLORIDE PRN MG: 2 INJECTION, SOLUTION INTRAMUSCULAR; INTRAVENOUS; SUBCUTANEOUS at 09:40
